# Patient Record
Sex: FEMALE | Race: WHITE | NOT HISPANIC OR LATINO | Employment: UNEMPLOYED | URBAN - METROPOLITAN AREA
[De-identification: names, ages, dates, MRNs, and addresses within clinical notes are randomized per-mention and may not be internally consistent; named-entity substitution may affect disease eponyms.]

---

## 2019-06-24 ENCOUNTER — LAB REQUISITION (OUTPATIENT)
Dept: LAB | Facility: HOSPITAL | Age: 69
End: 2019-06-24

## 2019-06-24 DIAGNOSIS — N39.0 URINARY TRACT INFECTION: ICD-10-CM

## 2019-06-24 DIAGNOSIS — N32.81 OVERACTIVE BLADDER: ICD-10-CM

## 2019-06-24 LAB
ALBUMIN SERPL-MCNC: 3.4 G/DL (ref 3.5–5.2)
ALBUMIN/GLOB SERPL: 0.9 G/DL
ALP SERPL-CCNC: 102 U/L (ref 39–117)
ALT SERPL W P-5'-P-CCNC: 47 U/L (ref 1–33)
ANION GAP SERPL CALCULATED.3IONS-SCNC: 13.6 MMOL/L
AST SERPL-CCNC: 36 U/L (ref 1–32)
BASOPHILS # BLD AUTO: 0.06 10*3/MM3 (ref 0–0.2)
BASOPHILS NFR BLD AUTO: 0.6 % (ref 0–1.5)
BILIRUB SERPL-MCNC: 0.3 MG/DL (ref 0.2–1.2)
BUN BLD-MCNC: 23 MG/DL (ref 8–23)
BUN/CREAT SERPL: 29.5 (ref 7–25)
CALCIUM SPEC-SCNC: 9.9 MG/DL (ref 8.6–10.5)
CHLORIDE SERPL-SCNC: 95 MMOL/L (ref 98–107)
CO2 SERPL-SCNC: 25.4 MMOL/L (ref 22–29)
CREAT BLD-MCNC: 0.78 MG/DL (ref 0.57–1)
DEPRECATED RDW RBC AUTO: 47.6 FL (ref 37–54)
EOSINOPHIL # BLD AUTO: 0.15 10*3/MM3 (ref 0–0.4)
EOSINOPHIL NFR BLD AUTO: 1.6 % (ref 0.3–6.2)
ERYTHROCYTE [DISTWIDTH] IN BLOOD BY AUTOMATED COUNT: 12.7 % (ref 12.3–15.4)
GFR SERPL CREATININE-BSD FRML MDRD: 73 ML/MIN/1.73
GFR SERPL CREATININE-BSD FRML MDRD: 88 ML/MIN/1.73
GLOBULIN UR ELPH-MCNC: 3.9 GM/DL
GLUCOSE BLD-MCNC: 122 MG/DL (ref 65–99)
HCT VFR BLD AUTO: 39.8 % (ref 34–46.6)
HGB BLD-MCNC: 13.2 G/DL (ref 12–15.9)
IMM GRANULOCYTES # BLD AUTO: 0.06 10*3/MM3 (ref 0–0.05)
IMM GRANULOCYTES NFR BLD AUTO: 0.6 % (ref 0–0.5)
LYMPHOCYTES # BLD AUTO: 1.73 10*3/MM3 (ref 0.7–3.1)
LYMPHOCYTES NFR BLD AUTO: 18.2 % (ref 19.6–45.3)
MCH RBC QN AUTO: 34.3 PG (ref 26.6–33)
MCHC RBC AUTO-ENTMCNC: 33.2 G/DL (ref 31.5–35.7)
MCV RBC AUTO: 103.4 FL (ref 79–97)
MONOCYTES # BLD AUTO: 1.02 10*3/MM3 (ref 0.1–0.9)
MONOCYTES NFR BLD AUTO: 10.7 % (ref 5–12)
NEUTROPHILS # BLD AUTO: 6.47 10*3/MM3 (ref 1.7–7)
NEUTROPHILS NFR BLD AUTO: 68.3 % (ref 42.7–76)
NRBC BLD AUTO-RTO: 0 /100 WBC (ref 0–0.2)
PLATELET # BLD AUTO: 387 10*3/MM3 (ref 140–450)
PMV BLD AUTO: 9.5 FL (ref 6–12)
POTASSIUM BLD-SCNC: 3.6 MMOL/L (ref 3.5–5.2)
PROT SERPL-MCNC: 7.3 G/DL (ref 6–8.5)
RBC # BLD AUTO: 3.85 10*6/MM3 (ref 3.77–5.28)
SODIUM BLD-SCNC: 134 MMOL/L (ref 136–145)
WBC NRBC COR # BLD: 9.49 10*3/MM3 (ref 3.4–10.8)

## 2019-06-24 PROCEDURE — 85025 COMPLETE CBC W/AUTO DIFF WBC: CPT | Performed by: INTERNAL MEDICINE

## 2019-06-24 PROCEDURE — 80053 COMPREHEN METABOLIC PANEL: CPT | Performed by: INTERNAL MEDICINE

## 2022-06-01 ENCOUNTER — HOSPITAL ENCOUNTER (OUTPATIENT)
Facility: HOSPITAL | Age: 72
Setting detail: OBSERVATION
Discharge: NURSING, REHAB OR LONG-TERM CARE | End: 2022-06-03
Attending: EMERGENCY MEDICINE | Admitting: INTERNAL MEDICINE

## 2022-06-01 ENCOUNTER — APPOINTMENT (OUTPATIENT)
Dept: CT IMAGING | Facility: HOSPITAL | Age: 72
End: 2022-06-01

## 2022-06-01 ENCOUNTER — APPOINTMENT (OUTPATIENT)
Dept: GENERAL RADIOLOGY | Facility: HOSPITAL | Age: 72
End: 2022-06-01

## 2022-06-01 DIAGNOSIS — S32.402A CLOSED NONDISPLACED FRACTURE OF LEFT ACETABULUM, UNSPECIFIED PORTION OF ACETABULUM, INITIAL ENCOUNTER: ICD-10-CM

## 2022-06-01 DIAGNOSIS — S32.592A CLOSED FRACTURE OF RAMUS OF LEFT PUBIS, INITIAL ENCOUNTER: Primary | ICD-10-CM

## 2022-06-01 LAB
ALBUMIN SERPL-MCNC: 4.6 G/DL (ref 3.5–5.2)
ALBUMIN/GLOB SERPL: 1.9 G/DL
ALP SERPL-CCNC: 94 U/L (ref 39–117)
ALT SERPL W P-5'-P-CCNC: 24 U/L (ref 1–33)
ANION GAP SERPL CALCULATED.3IONS-SCNC: 13.9 MMOL/L (ref 5–15)
AST SERPL-CCNC: 31 U/L (ref 1–32)
BASOPHILS # BLD AUTO: 0.05 10*3/MM3 (ref 0–0.2)
BASOPHILS NFR BLD AUTO: 0.6 % (ref 0–1.5)
BILIRUB SERPL-MCNC: 0.7 MG/DL (ref 0–1.2)
BUN SERPL-MCNC: 16 MG/DL (ref 8–23)
BUN/CREAT SERPL: 21.3 (ref 7–25)
CALCIUM SPEC-SCNC: 9.6 MG/DL (ref 8.6–10.5)
CHLORIDE SERPL-SCNC: 96 MMOL/L (ref 98–107)
CO2 SERPL-SCNC: 25.1 MMOL/L (ref 22–29)
CREAT SERPL-MCNC: 0.75 MG/DL (ref 0.57–1)
DEPRECATED RDW RBC AUTO: 48.5 FL (ref 37–54)
EGFRCR SERPLBLD CKD-EPI 2021: 84.2 ML/MIN/1.73
EOSINOPHIL # BLD AUTO: 0.08 10*3/MM3 (ref 0–0.4)
EOSINOPHIL NFR BLD AUTO: 0.9 % (ref 0.3–6.2)
ERYTHROCYTE [DISTWIDTH] IN BLOOD BY AUTOMATED COUNT: 13.2 % (ref 12.3–15.4)
ETHANOL BLD-MCNC: <10 MG/DL (ref 0–10)
ETHANOL UR QL: <0.01 %
FLUAV RNA RESP QL NAA+PROBE: NOT DETECTED
FLUBV RNA RESP QL NAA+PROBE: NOT DETECTED
GLOBULIN UR ELPH-MCNC: 2.4 GM/DL
GLUCOSE SERPL-MCNC: 66 MG/DL (ref 65–99)
HCT VFR BLD AUTO: 42.1 % (ref 34–46.6)
HGB BLD-MCNC: 13.5 G/DL (ref 12–15.9)
IMM GRANULOCYTES # BLD AUTO: 0.03 10*3/MM3 (ref 0–0.05)
IMM GRANULOCYTES NFR BLD AUTO: 0.3 % (ref 0–0.5)
INR PPP: 1.01 (ref 0.9–1.1)
LYMPHOCYTES # BLD AUTO: 1.51 10*3/MM3 (ref 0.7–3.1)
LYMPHOCYTES NFR BLD AUTO: 16.9 % (ref 19.6–45.3)
MCH RBC QN AUTO: 32.3 PG (ref 26.6–33)
MCHC RBC AUTO-ENTMCNC: 32.1 G/DL (ref 31.5–35.7)
MCV RBC AUTO: 100.7 FL (ref 79–97)
MONOCYTES # BLD AUTO: 0.76 10*3/MM3 (ref 0.1–0.9)
MONOCYTES NFR BLD AUTO: 8.5 % (ref 5–12)
NEUTROPHILS NFR BLD AUTO: 6.48 10*3/MM3 (ref 1.7–7)
NEUTROPHILS NFR BLD AUTO: 72.8 % (ref 42.7–76)
NRBC BLD AUTO-RTO: 0 /100 WBC (ref 0–0.2)
PLATELET # BLD AUTO: 171 10*3/MM3 (ref 140–450)
PMV BLD AUTO: 10.3 FL (ref 6–12)
POTASSIUM SERPL-SCNC: 4.3 MMOL/L (ref 3.5–5.2)
PROT SERPL-MCNC: 7 G/DL (ref 6–8.5)
PROTHROMBIN TIME: 13.5 SECONDS (ref 12.1–15)
RBC # BLD AUTO: 4.18 10*6/MM3 (ref 3.77–5.28)
SARS-COV-2 RNA RESP QL NAA+PROBE: NOT DETECTED
SODIUM SERPL-SCNC: 135 MMOL/L (ref 136–145)
TROPONIN T SERPL-MCNC: <0.01 NG/ML (ref 0–0.03)
TSH SERPL DL<=0.05 MIU/L-ACNC: 1.6 UIU/ML (ref 0.27–4.2)
WBC NRBC COR # BLD: 8.91 10*3/MM3 (ref 3.4–10.8)

## 2022-06-01 PROCEDURE — G0378 HOSPITAL OBSERVATION PER HR: HCPCS

## 2022-06-01 PROCEDURE — 93010 ELECTROCARDIOGRAM REPORT: CPT | Performed by: INTERNAL MEDICINE

## 2022-06-01 PROCEDURE — 84443 ASSAY THYROID STIM HORMONE: CPT | Performed by: NURSE PRACTITIONER

## 2022-06-01 PROCEDURE — 82077 ASSAY SPEC XCP UR&BREATH IA: CPT | Performed by: NURSE PRACTITIONER

## 2022-06-01 PROCEDURE — 93005 ELECTROCARDIOGRAM TRACING: CPT | Performed by: NURSE PRACTITIONER

## 2022-06-01 PROCEDURE — 87636 SARSCOV2 & INF A&B AMP PRB: CPT | Performed by: EMERGENCY MEDICINE

## 2022-06-01 PROCEDURE — 99220 PR INITIAL OBSERVATION CARE/DAY 70 MINUTES: CPT | Performed by: NURSE PRACTITIONER

## 2022-06-01 PROCEDURE — 85610 PROTHROMBIN TIME: CPT | Performed by: NURSE PRACTITIONER

## 2022-06-01 PROCEDURE — 84484 ASSAY OF TROPONIN QUANT: CPT | Performed by: NURSE PRACTITIONER

## 2022-06-01 PROCEDURE — 72192 CT PELVIS W/O DYE: CPT

## 2022-06-01 PROCEDURE — C9803 HOPD COVID-19 SPEC COLLECT: HCPCS

## 2022-06-01 PROCEDURE — 99283 EMERGENCY DEPT VISIT LOW MDM: CPT | Performed by: EMERGENCY MEDICINE

## 2022-06-01 PROCEDURE — 99283 EMERGENCY DEPT VISIT LOW MDM: CPT

## 2022-06-01 PROCEDURE — 85025 COMPLETE CBC W/AUTO DIFF WBC: CPT | Performed by: EMERGENCY MEDICINE

## 2022-06-01 PROCEDURE — 73502 X-RAY EXAM HIP UNI 2-3 VIEWS: CPT

## 2022-06-01 PROCEDURE — 80053 COMPREHEN METABOLIC PANEL: CPT | Performed by: EMERGENCY MEDICINE

## 2022-06-01 RX ORDER — ACETAMINOPHEN 650 MG/1
650 SUPPOSITORY RECTAL EVERY 4 HOURS PRN
Status: DISCONTINUED | OUTPATIENT
Start: 2022-06-01 | End: 2022-06-03 | Stop reason: HOSPADM

## 2022-06-01 RX ORDER — ENOXAPARIN SODIUM 100 MG/ML
40 INJECTION SUBCUTANEOUS EVERY 24 HOURS
Status: DISCONTINUED | OUTPATIENT
Start: 2022-06-02 | End: 2022-06-03 | Stop reason: HOSPADM

## 2022-06-01 RX ORDER — SODIUM CHLORIDE 0.9 % (FLUSH) 0.9 %
10 SYRINGE (ML) INJECTION EVERY 12 HOURS SCHEDULED
Status: DISCONTINUED | OUTPATIENT
Start: 2022-06-01 | End: 2022-06-03 | Stop reason: HOSPADM

## 2022-06-01 RX ORDER — SODIUM CHLORIDE 9 MG/ML
40 INJECTION, SOLUTION INTRAVENOUS AS NEEDED
Status: DISCONTINUED | OUTPATIENT
Start: 2022-06-01 | End: 2022-06-03 | Stop reason: HOSPADM

## 2022-06-01 RX ORDER — OXYCODONE HYDROCHLORIDE AND ACETAMINOPHEN 5; 325 MG/1; MG/1
1 TABLET ORAL EVERY 4 HOURS PRN
Status: DISCONTINUED | OUTPATIENT
Start: 2022-06-01 | End: 2022-06-02

## 2022-06-01 RX ORDER — ONDANSETRON 2 MG/ML
4 INJECTION INTRAMUSCULAR; INTRAVENOUS EVERY 6 HOURS PRN
Status: DISCONTINUED | OUTPATIENT
Start: 2022-06-01 | End: 2022-06-03 | Stop reason: HOSPADM

## 2022-06-01 RX ORDER — SODIUM CHLORIDE 0.9 % (FLUSH) 0.9 %
10 SYRINGE (ML) INJECTION AS NEEDED
Status: DISCONTINUED | OUTPATIENT
Start: 2022-06-01 | End: 2022-06-03 | Stop reason: HOSPADM

## 2022-06-01 RX ORDER — ACETAMINOPHEN 160 MG/5ML
650 SOLUTION ORAL EVERY 4 HOURS PRN
Status: DISCONTINUED | OUTPATIENT
Start: 2022-06-01 | End: 2022-06-03 | Stop reason: HOSPADM

## 2022-06-01 RX ORDER — CHOLECALCIFEROL (VITAMIN D3) 125 MCG
5 CAPSULE ORAL NIGHTLY PRN
Status: DISCONTINUED | OUTPATIENT
Start: 2022-06-01 | End: 2022-06-03 | Stop reason: HOSPADM

## 2022-06-01 RX ORDER — ONDANSETRON 4 MG/1
4 TABLET, FILM COATED ORAL EVERY 6 HOURS PRN
Status: DISCONTINUED | OUTPATIENT
Start: 2022-06-01 | End: 2022-06-03 | Stop reason: HOSPADM

## 2022-06-01 RX ORDER — ACETAMINOPHEN 325 MG/1
650 TABLET ORAL EVERY 4 HOURS PRN
Status: DISCONTINUED | OUTPATIENT
Start: 2022-06-01 | End: 2022-06-03 | Stop reason: HOSPADM

## 2022-06-01 RX ADMIN — OXYCODONE HYDROCHLORIDE AND ACETAMINOPHEN 1 TABLET: 5; 325 TABLET ORAL at 22:27

## 2022-06-01 RX ADMIN — Medication 10 ML: at 22:27

## 2022-06-01 NOTE — ED PROVIDER NOTES
"Subjective     History provided by:  Patient and relative (Son)    History of Present Illness    · Chief complaint: Pain in the left groin of the pelvis.    · Location: Left groin groin to the pelvis.    · Quality/Severity: Pain is moderately severe.    · Timing/Onset: She fell injuring herself yesterday.    · Modifying Factors: She is unable to bear weight on her left lower extremity due to the pain.    · Associated symptoms: Denies striking her head denies neck or back pain.    · Narrative: The patient is a 72-year-old white female who states she lost her balance and fell on her left side yesterday.  She has had pain in her left groin area since.  She has been unable to bear weight on her left lower extremity due to pain in the left groin.  She normally ambulates with a walker.  She has a history of cervical and lumbar spine surgeries.  She has a history of chronic pain disorder.    Review of Systems   Constitutional: Negative for activity change, appetite change, chills and fever.   HENT: Negative for congestion, ear pain, rhinorrhea, sinus pain, sore throat and voice change.    Eyes: Negative for pain and visual disturbance.   Respiratory: Negative for shortness of breath.    Cardiovascular: Negative for chest pain and palpitations.   Gastrointestinal: Negative for abdominal pain, diarrhea, nausea and vomiting.   Genitourinary: Positive for pelvic pain ( Left groin). Negative for dysuria and flank pain.   Musculoskeletal: Positive for gait problem ( Due to left groin pain). Negative for neck pain and neck stiffness.   Skin: Negative for rash.   Neurological: Negative for dizziness, syncope, numbness and headaches.     History reviewed. No pertinent past medical history.  /79 (BP Location: Left arm, Patient Position: Lying)   Pulse 90   Temp 98.8 °F (37.1 °C) (Oral)   Resp 16   Ht 157.5 cm (62\")   Wt 54 kg (119 lb)   SpO2 96%   BMI 21.77 kg/m²     History reviewed. No pertinent past medical " history.  Labs Reviewed   COMPREHENSIVE METABOLIC PANEL - Abnormal; Notable for the following components:       Result Value    Sodium 135 (*)     Chloride 96 (*)     All other components within normal limits    Narrative:     GFR Normal >60  Chronic Kidney Disease <60  Kidney Failure <15     CBC WITH AUTO DIFFERENTIAL - Abnormal; Notable for the following components:    .7 (*)     Lymphocyte % 16.9 (*)     All other components within normal limits   COVID PRE-OP / PRE-PROCEDURE SCREENING ORDER (NO ISOLATION)    Narrative:     The following orders were created for panel order COVID PRE-OP / PRE-PROCEDURE SCREENING ORDER (NO ISOLATION) - Swab, Nasopharynx.  Procedure                               Abnormality         Status                     ---------                               -----------         ------                     COVID-19 and FLU A/B PCR...[[id number]]                      In process                   Please view results for these tests on the individual orders.   COVID-19 AND FLU A/B, NP SWAB IN TRANSPORT MEDIA 8-12 HR TAT   URINE DRUG SCREEN   ETHANOL   CBC AND DIFFERENTIAL    Narrative:     The following orders were created for panel order CBC & Differential.  Procedure                               Abnormality         Status                     ---------                               -----------         ------                     CBC Auto Differential[[id number]]        Abnormal            Final result                 Please view results for these tests on the individual orders.       No Known Allergies    History reviewed. No pertinent surgical history.    History reviewed. No pertinent family history.    Social History     Socioeconomic History   • Marital status: Single   Tobacco Use   • Smoking status: Current Every Day Smoker     Packs/day: 0.50   Substance and Sexual Activity   • Alcohol use: Yes     Comment: SOCIALLY   • Drug use: Never           Objective   Physical Exam  Vitals and  nursing note reviewed.   Constitutional:       General: She is not in acute distress.     Appearance: Normal appearance. She is well-developed. She is not ill-appearing, toxic-appearing or diaphoretic.      Comments: The patient appears in no acute distress.  Review of her vital signs: She is afebrile with a temperature of 98.4, respirations normal 16 with a normal room air oxygen saturation 100%, slightly tachycardic with a heart rate of 100, blood pressure normal 131/82.   HENT:      Head: Normocephalic and atraumatic.      Nose: Nose normal.      Mouth/Throat:      Mouth: Mucous membranes are moist.      Pharynx: Oropharynx is clear.   Eyes:      General: No scleral icterus.        Right eye: No discharge.         Left eye: No discharge.      Conjunctiva/sclera: Conjunctivae normal.      Pupils: Pupils are equal, round, and reactive to light.   Neck:      Thyroid: No thyromegaly.      Vascular: No JVD.   Cardiovascular:      Rate and Rhythm: Normal rate and regular rhythm.      Heart sounds: Normal heart sounds. No murmur heard.  Pulmonary:      Effort: Pulmonary effort is normal.      Breath sounds: Normal breath sounds. No wheezing or rales.   Chest:      Chest wall: No tenderness.   Abdominal:      General: Bowel sounds are normal. There is no distension.      Palpations: Abdomen is soft.      Tenderness: There is no abdominal tenderness. There is no guarding.   Musculoskeletal:         General: No tenderness or deformity. Normal range of motion.      Cervical back: Normal range of motion and neck supple. No tenderness.      Right lower leg: Edema present.      Left lower leg: Edema present.      Comments: The patient is no tenderness to the thoracic or lumbar spine.  Her pelvis is stable.  She has no tenderness to palpation over the left or right hip.  She does have range of motion of the left hip with discomfort in the left groin area.  There is no tenderness or deformity of the left thigh, knee, lower leg,  foot or ankle.   Lymphadenopathy:      Cervical: No cervical adenopathy.   Skin:     General: Skin is warm and dry.      Capillary Refill: Capillary refill takes less than 2 seconds.      Findings: No rash.      Comments: Both feet have a purplish cyanotic this colorization.   Neurological:      General: No focal deficit present.      Mental Status: She is alert and oriented to person, place, and time.      Cranial Nerves: No cranial nerve deficit.      Coordination: Coordination normal.      Comments: No focal motor sensory deficit   Psychiatric:         Mood and Affect: Mood normal.         Behavior: Behavior normal.         Thought Content: Thought content normal.         Judgment: Judgment normal.         Procedures           ED Course  ED Course as of 06/01/22 2146 Wed Jun 01, 2022 2142 The patient's x-ray of her pelvis and left hip showed superior and inferior pubic ramus fractures.  A CT was obtained to further delineate them.  The CT of the pelvis showed a slightly displaced inferior pubic ramus fracture, minimal cortical disruption of the superior pubic ramus, and a subtle probable transverse fracture of the acetabulum. [TP]   2143 18:55 and again at 19:20 the patient was discussed with Dr. Reed, orthopedics on-call, who states that the patient's fractures appear to be stable and not require surgery.  He stated the patient could go home if she could ambulate with a walker, otherwise she would need to be admitted for physical therapy evaluation. [TP]   2144 We did get the patient up and attempt to ambulate her with a walker and she was unable to move her left leg to ambulate using a walker. [TP]   2144 20:11 patient discussed with Renata Naqvi, hospitalist, who agreed to admit the patient for physical therapy evaluation. [TP]   2145 Review of the patient's admission labs: Her CMP has a slightly low sodium of 135 and a slightly low chloride of 96, otherwise normal electrolytes, normal renal and liver  function test.  CBC was essentially normal. [TP]      ED Course User Index  [TP] Tramaine Baldwin MD                                                 [facility]  Number of Diagnoses or Management Options  Closed fracture of ramus of left pubis, initial encounter ([facility]): new and requires workup  Closed nondisplaced fracture of left acetabulum, unspecified portion of acetabulum, initial encounter ([facility]): new and requires workup     Amount and/or Complexity of Data Reviewed  Clinical lab tests: ordered and reviewed  Tests in the radiology section of CPT®: ordered and reviewed  Discuss the patient with other providers: yes    Risk of Complications, Morbidity, and/or Mortality  Presenting problems: moderate  Diagnostic procedures: high  Management options: moderate    Patient Progress  Patient progress: stable      Final diagnoses:   Closed fracture of ramus of left pubis, initial encounter ([facility])   Closed nondisplaced fracture of left acetabulum, unspecified portion of acetabulum, initial encounter ([facility])       ED Disposition  ED Disposition     ED Disposition   Decision to Admit    Condition   --    Comment   Level of Care: Med/Surg [1]   Diagnosis: Closed fracture of ramus of left pubis, initial encounter ([facility]) [[id number]]   Admitting Physician: MYRIAM FUENTES [[id number]]   Bed Request Comments: Left superior and inferior pubic ramus fractures and acetabular fracture               No follow-up provider specified.       Medication List      No changes were made to your prescriptions during this visit.         Labs Reviewed   COMPREHENSIVE METABOLIC PANEL - Abnormal; Notable for the following components:       Result Value    Sodium 135 (*)     Chloride 96 (*)     All other components within normal limits    Narrative:     GFR Normal >60  Chronic Kidney Disease <60  Kidney Failure <15     CBC WITH AUTO DIFFERENTIAL - Abnormal; Notable for the following components:    .7 (*)     Lymphocyte % 16.9 (*)     All other components within  normal limits   COVID PRE-OP / PRE-PROCEDURE SCREENING ORDER (NO ISOLATION)    Narrative:     The following orders were created for panel order COVID PRE-OP / PRE-PROCEDURE SCREENING ORDER (NO ISOLATION) - Swab, Nasopharynx.  Procedure                               Abnormality         Status                     ---------                               -----------         ------                     COVID-19 and FLU A/B PCR...[[id number]]                      In process                   Please view results for these tests on the individual orders.   COVID-19 AND FLU A/B, NP SWAB IN TRANSPORT MEDIA 8-12 HR TAT   URINE DRUG SCREEN   ETHANOL   CBC AND DIFFERENTIAL    Narrative:     The following orders were created for panel order CBC & Differential.  Procedure                               Abnormality         Status                     ---------                               -----------         ------                     CBC Auto Differential[[id number]]        Abnormal            Final result                 Please view results for these tests on the individual orders.     CT Pelvis Without Contrast   Final Result   Slightly displaced mid left inferior pubic ramus fracture      Subtle probable transverse cortical fracture involving the acetabulum. Millimeters anterior to the left hip prosthesis      Possible minimal cortical interruption in the left suprapubic ramus near the pubic symphysis. These images are degraded by streak artifact               Signer Name: Sean Valles MD    Signed: 6/1/2022 6:45 PM    Workstation Name: [facility]     [facility]      XR Hip With or Without Pelvis 2 - 3 View Left   Final Result   Findings suggest a subtle fracture involving the superior pubic ramus. There is a more conspicuous disruption of the inferior pubic ramus but it is difficult to tell if that represents an old injury or an acute injury. Consider CT imaging      Signer Name: Sean Valles MD    Signed:  6/1/2022 5:47 PM    Workstation Name: [email or web address]     [facility]             Medication List      No changes were made to your prescriptions during this visit.              Tramaine Baldwin MD  06/01/22 9190

## 2022-06-02 PROBLEM — E44.0 MODERATE MALNUTRITION (HCC): Status: ACTIVE | Noted: 2022-06-02

## 2022-06-02 LAB
AMPHET+METHAMPHET UR QL: NEGATIVE
AMPHETAMINES UR QL: NEGATIVE
BACTERIA UR QL AUTO: ABNORMAL /HPF
BARBITURATES UR QL SCN: NEGATIVE
BENZODIAZ UR QL SCN: NEGATIVE
BILIRUB UR QL STRIP: NEGATIVE
BUPRENORPHINE SERPL-MCNC: NEGATIVE NG/ML
CANNABINOIDS SERPL QL: NEGATIVE
CLARITY UR: ABNORMAL
COCAINE UR QL: NEGATIVE
COLOR UR: YELLOW
GLUCOSE UR STRIP-MCNC: NEGATIVE MG/DL
HGB UR QL STRIP.AUTO: ABNORMAL
HYALINE CASTS UR QL AUTO: ABNORMAL /LPF
KETONES UR QL STRIP: ABNORMAL
LEUKOCYTE ESTERASE UR QL STRIP.AUTO: ABNORMAL
METHADONE UR QL SCN: NEGATIVE
NITRITE UR QL STRIP: NEGATIVE
OPIATES UR QL: POSITIVE
OXYCODONE UR QL SCN: POSITIVE
PCP UR QL SCN: NEGATIVE
PH UR STRIP.AUTO: 6 [PH] (ref 4.5–8)
PROPOXYPH UR QL: NEGATIVE
PROT UR QL STRIP: NEGATIVE
QT INTERVAL: 360 MS
RBC # UR STRIP: ABNORMAL /HPF
REF LAB TEST METHOD: ABNORMAL
SP GR UR STRIP: 1.02 (ref 1–1.03)
SQUAMOUS #/AREA URNS HPF: ABNORMAL /HPF
TRICYCLICS UR QL SCN: NEGATIVE
UROBILINOGEN UR QL STRIP: ABNORMAL
WBC # UR STRIP: ABNORMAL /HPF

## 2022-06-02 PROCEDURE — 99225 PR SBSQ OBSERVATION CARE/DAY 25 MINUTES: CPT | Performed by: INTERNAL MEDICINE

## 2022-06-02 PROCEDURE — 81001 URINALYSIS AUTO W/SCOPE: CPT | Performed by: NURSE PRACTITIONER

## 2022-06-02 PROCEDURE — 87086 URINE CULTURE/COLONY COUNT: CPT | Performed by: NURSE PRACTITIONER

## 2022-06-02 PROCEDURE — 96372 THER/PROPH/DIAG INJ SC/IM: CPT

## 2022-06-02 PROCEDURE — G0378 HOSPITAL OBSERVATION PER HR: HCPCS

## 2022-06-02 PROCEDURE — 96374 THER/PROPH/DIAG INJ IV PUSH: CPT

## 2022-06-02 PROCEDURE — 97161 PT EVAL LOW COMPLEX 20 MIN: CPT

## 2022-06-02 PROCEDURE — 97165 OT EVAL LOW COMPLEX 30 MIN: CPT

## 2022-06-02 PROCEDURE — 80306 DRUG TEST PRSMV INSTRMNT: CPT | Performed by: NURSE PRACTITIONER

## 2022-06-02 PROCEDURE — 25010000002 HYDROMORPHONE PER 4 MG: Performed by: NURSE PRACTITIONER

## 2022-06-02 PROCEDURE — 25010000002 ENOXAPARIN PER 10 MG: Performed by: NURSE PRACTITIONER

## 2022-06-02 RX ORDER — OXYCODONE AND ACETAMINOPHEN 7.5; 325 MG/1; MG/1
1 TABLET ORAL EVERY 4 HOURS PRN
Status: DISCONTINUED | OUTPATIENT
Start: 2022-06-02 | End: 2022-06-02

## 2022-06-02 RX ORDER — NICOTINE 21 MG/24HR
1 PATCH, TRANSDERMAL 24 HOURS TRANSDERMAL
Status: DISCONTINUED | OUTPATIENT
Start: 2022-06-02 | End: 2022-06-03 | Stop reason: HOSPADM

## 2022-06-02 RX ORDER — GABAPENTIN 100 MG/1
100 CAPSULE ORAL EVERY 12 HOURS SCHEDULED
Status: DISCONTINUED | OUTPATIENT
Start: 2022-06-02 | End: 2022-06-02

## 2022-06-02 RX ORDER — HYDROCODONE BITARTRATE AND ACETAMINOPHEN 7.5; 325 MG/1; MG/1
1 TABLET ORAL EVERY 4 HOURS PRN
Status: DISCONTINUED | OUTPATIENT
Start: 2022-06-02 | End: 2022-06-02

## 2022-06-02 RX ORDER — HYDROMORPHONE HCL 110MG/55ML
0.5 PATIENT CONTROLLED ANALGESIA SYRINGE INTRAVENOUS
Status: DISCONTINUED | OUTPATIENT
Start: 2022-06-02 | End: 2022-06-02

## 2022-06-02 RX ORDER — GABAPENTIN 100 MG/1
100 CAPSULE ORAL EVERY 8 HOURS SCHEDULED
Status: DISCONTINUED | OUTPATIENT
Start: 2022-06-02 | End: 2022-06-03 | Stop reason: HOSPADM

## 2022-06-02 RX ORDER — HYDROCODONE BITARTRATE AND ACETAMINOPHEN 7.5; 325 MG/1; MG/1
1 TABLET ORAL EVERY 4 HOURS PRN
Status: DISCONTINUED | OUTPATIENT
Start: 2022-06-02 | End: 2022-06-03 | Stop reason: HOSPADM

## 2022-06-02 RX ADMIN — HYDROMORPHONE HYDROCHLORIDE 0.5 MG: 2 INJECTION, SOLUTION INTRAMUSCULAR; INTRAVENOUS; SUBCUTANEOUS at 03:08

## 2022-06-02 RX ADMIN — OXYCODONE HYDROCHLORIDE AND ACETAMINOPHEN 1 TABLET: 5; 325 TABLET ORAL at 02:37

## 2022-06-02 RX ADMIN — DICLOFENAC 4 G: 10 GEL TOPICAL at 22:43

## 2022-06-02 RX ADMIN — DICLOFENAC 4 G: 10 GEL TOPICAL at 13:21

## 2022-06-02 RX ADMIN — DICLOFENAC 4 G: 10 GEL TOPICAL at 08:16

## 2022-06-02 RX ADMIN — GABAPENTIN 100 MG: 100 CAPSULE ORAL at 22:43

## 2022-06-02 RX ADMIN — ENOXAPARIN SODIUM 40 MG: 40 INJECTION SUBCUTANEOUS at 08:15

## 2022-06-02 RX ADMIN — GABAPENTIN 100 MG: 100 CAPSULE ORAL at 08:39

## 2022-06-02 RX ADMIN — Medication 1 PATCH: at 08:15

## 2022-06-02 RX ADMIN — DICLOFENAC 4 G: 10 GEL TOPICAL at 17:32

## 2022-06-02 RX ADMIN — HYDROCODONE BITARTRATE AND ACETAMINOPHEN 1 TABLET: 7.5; 325 TABLET ORAL at 20:10

## 2022-06-02 RX ADMIN — HYDROCODONE BITARTRATE AND ACETAMINOPHEN 1 TABLET: 7.5; 325 TABLET ORAL at 08:39

## 2022-06-02 RX ADMIN — Medication 10 ML: at 08:15

## 2022-06-02 RX ADMIN — HYDROCODONE BITARTRATE AND ACETAMINOPHEN 1 TABLET: 7.5; 325 TABLET ORAL at 14:42

## 2022-06-02 NOTE — PLAN OF CARE
Goal Outcome Evaluation:  Plan of Care Reviewed With: patient           Outcome Evaluation: OT evaluation completed. pt required mod assist x 2 for functional transfers from EOB with RW x 2 attempts. pt unable to achieve full upright posture 2 pain. anticipate pt will require mod assist for lb adls. pt with decreased safety awareness, unaware of need for assist. pt would benefit from skilled OT services to address adl retraining, functional transfers and balance. anticipate pt will require STR prior to return home alone

## 2022-06-02 NOTE — CASE MANAGEMENT/SOCIAL WORK
Continued Stay Note  KODY Padron     Patient Name: Maru Doty  MRN: [id number]  Today's Date: 6/2/2022    Admit Date: 6/1/2022     Discharge Plan     Row Name 06/02/22 1425       Plan    Plan STR    Patient/Family in Agreement with Plan yes    Plan Comments Followed back up with patient and she states that her family will not be able to stay with her and she would like referral sent to [facility] and [facility] [place]. CM called and left voice mail for Ana at [facility] regarding the referral and also put in EPIC basked. CM called and spoke with Mellissa at [facility] [place] with referral and also put in EPIC basket. CM awaits return call from facilities for disposition.               Discharge Codes    No documentation.                     Nguyen Padilla RN

## 2022-06-02 NOTE — PROGRESS NOTES
Contacted by staff regarding patient having 10 out of 10 pain.  Added IV Dilaudid dosing, increase Norco to 7.5 mg, add Voltaren for knee pain, monitor all for effect

## 2022-06-02 NOTE — CASE MANAGEMENT/SOCIAL WORK
Discharge Planning Assessment  KODY Padron     Patient Name: Maru Doty  MRN: [id number]  Today's Date: 6/2/2022    Admit Date: 6/1/2022     Discharge Needs Assessment     Row Name 06/02/22 1118       Living Environment    People in Home alone    Current Living Arrangements apartment  first floor apartment with no steps to gain entry    Duration at Residence 3 Y    Potentially Unsafe Housing Conditions --  none    Primary Care Provided by self    Provides Primary Care For no one    Caregiving Concerns caring for self due to pelvic fx    Family Caregiver if Needed child(sathya), adult    Family Caregiver Names daughter Valentine and son Ronald and two nieces    Quality of Family Relationships unable to assess    Able to Return to Prior Arrangements --  pt is unsure at this time if she will be able to return back to her home and will need to speak with her children to see if they can stay with her    Living Arrangement Comments Patient states she lives alone in a first floor apartment with no steps to gain entry       Resource/Environmental Concerns    Resource/Environmental Concerns none    Transportation Concerns none       Transition Planning    Patient/Family Anticipates Transition to home with family;inpatient rehabilitation facility    Patient/Family Anticipated Services at Transition home health care;rehabilitation services    Transportation Anticipated family or friend will provide  patient states that either her son on daughter will be able to provide ride home at discharge       Discharge Needs Assessment    Readmission Within the Last 30 Days no previous admission in last 30 days    Current Outpatient/Agency/Support Group --  none    Equipment Currently Used at Home grab bar;cane, straight;walker, rolling;shower chair    Concerns to be Addressed adjustment to diagnosis/illness;discharge planning    Concerns Comments decreased mobility due to recent fall and pelvic fx    Anticipated Changes Related to Illness  inability to care for self    Equipment Needed After Discharge none    Outpatient/Agency/Support Group Needs homecare agency    Discharge Facility/Level of Care Needs rehabilitation facility;home with home health    Provided Post Acute Provider List? Yes    Post Acute Provider List Home Health;Inpatient Rehab;Nursing Home    Provided Post Acute Provider Quality & Resource List? Yes    Post Acute Provider Quality and Resource List Home Health;Inpatient Rehab;Nursing Home    Delivered To Patient    Method of Delivery In person    Patient's Choice of Community Agency(s) none    Current Discharge Risk dependent with mobility/activities of daily living    Discharge Coordination/Progress Patient states she will need to speak with her adult children to see if they can help at home and go home with home health vs STR               Discharge Plan     Row Name 06/02/22 1127       Plan    Plan Home with home health vs STR    Patient/Family in Agreement with Plan yes    Plan Comments Into room and introduced self and role of CM. Discussed discharge disposition with patient at bedside. Patient is currently sitting up at side of the bed and has no complaints at this time. Patient confirms that none of the information on her face sheet is correct and CM updated demographics reflecting current info. Patient states that she see's Dr. Wade Swann as her PCP. She states that she uses [facility] in [place] and normally has no problem paying for her meds and her daughter picks them up. She also states that she does not have a living will and declines information regarding one. Patient states she lives alone in a first floor apartment with no steps to gain entry and normally has no problem maneuvering in or out or within the home. She states that she is independent with her ADL's but does not drive and her daughter provides her transportation and will be able to provide ride home at discharge. She also states that she currently  "uses a rolling walker, shower chair, grab bars and has straight canes she can use and does not anticipate needing any other equipment at discharge. Patient states she has used home health in the past and thinks it was [facility] and would use them again if needed. CM did discuss with patient the possible need for STR due to immobility and living alone and patient states \"I would rather go home and have home health come for PT\". CM informed patient that PT/OT would evaluate today and give their recommendations and if they recommended STR would she consider it and she states \"I will need to talk with my children before deciding\". CM also discussed with patient that if she did go  home with home health for her safety she would need someone to stay with her for a while to help with her needs and she states \"I will check with my daughter to see if she can\". CM provided patient with a list of home health agencies and STR facilities for her preference. Patient had no other questions or concerns regarding discharge plans. Name and number placed on white board in room. CM will continue to follow for needs.              Continued Care and Services - Admitted Since 6/1/2022    Coordination has not been started for this encounter.          Demographic Summary     Row Name 06/02/22 1118       General Information    Admission Type observation    Arrived From home    Referral Source admission list    Reason for Consult discharge planning    Preferred Language English       Contact Information    Permission Granted to Share Info With                Functional Status    No documentation.                Psychosocial    No documentation.                Abuse/Neglect    No documentation.                Legal    No documentation.                Substance Abuse    No documentation.                Patient Forms    No documentation.                   Nguyen Padilla RN    "

## 2022-06-02 NOTE — H&P
[facility] HOSPITALIST     Nhan Enriquez MD    CHIEF COMPLAINT: hip pain, fall    HISTORY OF PRESENT ILLNESS:  The patient is a 72 YOF who presented to the emergency department secondary to acute onset left groin pain after accidental fall on 5/31/2022.  Since then she has been unable to bear weight on the left leg due to pain.  She typically ambulates with a walker and was unable to ambulate in ER.  ER provider contacted Dr. Reed who noted no surgical intervention planned however if patient unable to bear weight and ambulate with walker, needed admission to assist. Patient notes she lives alone, does not drive.    Diagnostics in ER revealed slightly displaced mid left inferior pubic ramus fracture, transverse cortical fracture involving the acetabulum, see full report in epic    History of gait instability with frequent falls, cervical myelopathy, hypertension, hyperlipidemia, depression, chronic pain on pain contract, tobacco abuse, urinary incontinence, left femoral neck fracture 2014    She otherwise denies f/c/headache/rhinorrhea/nasal congestion/lightheadedness/syncopal sensation/cough/soa/n/v/d/chest pain/abdominal pain/recent illness/sick exposures/change in bowel or bladder habits/no weight change/bloody emesis or bloody stools/change in medications or any other new concerns.    History reviewed. No pertinent past medical history.  History reviewed. No pertinent surgical history.  History reviewed. No pertinent family history.  Social History     Tobacco Use   • Smoking status: Current Every Day Smoker     Packs/day: 0.50   Substance Use Topics   • Alcohol use: Yes     Comment: SOCIALLY   • Drug use: Never     No medications prior to admission.     Allergies:  Patient has no known allergies.    Immunization History   Administered Date(s) Administered   • COVID-19 (PFIZER) PURPLE CAP 03/16/2021, 04/06/2021, 11/30/2021       REVIEW OF SYSTEMS:  Please see the above history of present  "illness for pertinent positives and negatives.  The remainder of the patient's systems have been reviewed and are negative.     Vital Signs  Temp:  [98.4 °F (36.9 °C)-98.8 °F (37.1 °C)] 98.8 °F (37.1 °C)  Heart Rate:  [] 90  Resp:  [16] 16  BP: (107-131)/(66-83) 109/79   Body mass index is 21.77 kg/m².    Flowsheet Rows    Flowsheet Row First Filed Value   Admission Height 157.5 cm (62\") Documented at 06/01/2022 1653   Admission Weight 54 kg (119 lb) Documented at 06/01/2022 1653             Physical Exam  Vitals reviewed.   Constitutional:       General: She is not in acute distress.     Appearance: Normal appearance. She is not ill-appearing.   HENT:      Head: Normocephalic and atraumatic.      Mouth/Throat:      Mouth: Mucous membranes are moist.   Eyes:      Extraocular Movements: Extraocular movements intact.      Pupils: Pupils are equal, round, and reactive to light.   Cardiovascular:      Rate and Rhythm: Normal rate and regular rhythm.   Pulmonary:      Effort: Pulmonary effort is normal. No respiratory distress.      Breath sounds: Normal breath sounds. No wheezing or rales.   Abdominal:      General: Abdomen is flat. Bowel sounds are normal. There is no distension.      Palpations: Abdomen is soft.      Tenderness: There is no abdominal tenderness. There is no guarding.   Musculoskeletal:      Comments: Trace L>R foot pitting edema   Skin:     General: Skin is warm and dry.      Capillary Refill: Capillary refill takes less than 2 seconds.      Comments: Bilateral feet with fungal toenail overgrowth, purplish discoloration. Pedal pulses palpable   Neurological:      General: No focal deficit present.      Mental Status: She is alert and oriented to person, place, and time.   Psychiatric:         Mood and Affect: Mood normal.         Behavior: Behavior normal.       Emotional Behavior:    Judgement and Insight: good   Mental Status:  Alertness  alert   Memory:  good   Mood and Affect:     "     Depression  none               Anxiety  none    Debilities:   Physical Weakness  Chronic and secondary to fracture   Handicaps  Chronic LE weakness   Disabilities  Chronic LE weakness   Agitation  none     Results Review:    I reviewed the patient's new clinical results.  Lab Results (most recent)     None          Imaging Results (Most Recent)     Procedure Component Value Units Date/Time    CT Pelvis Without Contrast [[id number]] Collected: 06/01/22 1845     Updated: 06/01/22 1847    Narrative:      CT Pelvis WO    INDICATION:   Fall yesterday with left hip pain and abnormal plain films suggesting pubic rami fractures on the left    TECHNIQUE:   CT of the pelvis without IV contrast. Coronal and sagittal reconstructions were obtained.  Radiation dose reduction techniques included automated exposure control or exposure modulation based on body size. Count of known CT and cardiac nuc med studies  performed in previous 12 months: 0.     COMPARISON:   Hip series earlier today    FINDINGS:  There is grade 1 anterior spondylolisthesis of L4 and L5. There are marked facet degenerative changes at L4-5. The sacrum is intact. There is a definite fracture through the inferior pubic ramus that is displaced about 5 mm. Is difficult to tell if there  is a subtle cortical fracture in the left superior pubic ramus near the pubic symphysis because of streak artifact from the hip prosthesis. There appears be a small cortical fracture involving the acetabulum just anterior to the hip prosthesis on axial  image 30 is nondisplaced.. There is no dislocation      Impression:      Slightly displaced mid left inferior pubic ramus fracture    Subtle probable transverse cortical fracture involving the acetabulum. Millimeters anterior to the left hip prosthesis    Possible minimal cortical interruption in the left suprapubic ramus near the pubic symphysis. These images are degraded by streak artifact          Signer Name: Sean Valles MD    Signed: 6/1/2022 6:45 PM   Workstation Name: [facility]    [facility]    XR Hip With or Without Pelvis 2 - 3 View Left [[id number]] Collected: 06/01/22 1747     Updated: 06/01/22 1749    Narrative:      CR Hip Uni Comp Min 2 Vws LT    INDICATION:   Fall yesterday with pain in left hip region. Patient had hip replacement 6 years ago.    COMPARISON:   None available.    FINDINGS:  AP and frog-leg lateral view(s) of the left hip.  The AP view of the right hip appears normal. On the left side there is irregularity within the inferior pubic ramus is a subtle step-off involving the superior pubic ramus where it meets the pubic  symphysis. The left hip prosthesis is intact and there is no dislocation No bone erosion or destruction.        Impression:      Findings suggest a subtle fracture involving the superior pubic ramus. There is a more conspicuous disruption of the inferior pubic ramus but it is difficult to tell if that represents an old injury or an acute injury. Consider CT imaging    Signer Name: Sean Valles MD   Signed: 6/1/2022 5:47 PM   [facility]    [facility]        reviewed    ECG/EMG Results (most recent)     None        Pending    Assessment & Plan   Acute displaced mid left inferior pubic ramus fracture:  Transverse cortical acetabulum fracture:   Gait instability with history of frequent falls:  History of left femoral neck fracture s/p repair 2014:   Per Dr. Reed, no surgical intervention indicated  Consult PT/OT to evaluate  Control pain, add hydrocodone, baclofen, gabapentin    Hypertension:  Blood pressure controlled on Maxide, continue    Chronic pain on pain contract:  Chronic cervical myelopathy:  Don notes no gabapentin since 1/2022 but confirms hydrocodone use  Check UDS    Chronic urinary incontinence:  Rule out UTI with UA C&S  Hold home Myrbetriq    Tobacco abuse: Add nicotine patch    Hyperlipidemia: No current acute  "issues, add home Lipitor, aspirin    Depression: No current acute issues, add home Wellbutrin, Effexor    History of alcohol abuse: Reports no use, check ethanol level  Review of care everywhere shows admission with alcohol withdrawal/encephalopathy 6/2019    Chronic LE discoloration: noted back 6/2019 during that admission with normal ABIs/venous dopplers    I discussed the patient's findings and my recommendations with patient and staff.     Kaleigh Naqvi, TYRA  06/01/22  21:52 EDT      As of April 2021, as required by the Federal 21st Century Cures Act, medical records (including provider notes and laboratory/imaging results) are to be made available to patients and/or their designees as soon as the documents are signed/resulted. While the intention is to ensure transparency and to engage patients in their healthcare, this immediate access may create unintended consequences because this document uses language intended for communication between medical providers for interpretation with the entirety of the patient's clinical picture in mind. It is recommended that patients and/or their designees review all available information with their primary or specialist providers for explanation and to avoid misinterpretation of this information.     \"Dictated utilizing Dragon dictation\"      "

## 2022-06-02 NOTE — DISCHARGE PLACEMENT REQUEST
"Mary Doty (72 y.o. Female)             Date of Birth   1950    Social Security Number       Address   [address] [place], [place] [address]    Home Phone   [phone]    MRN   [id number]       Rastafarian   None    Marital Status   Single                            Admission Date   6/1/22    Admission Type   Emergency    Admitting Provider   Milena Romero MD    Attending Provider   Milena Romero MD    Department, Room/Bed   [facility] MED SURG, 1405/1       Discharge Date       Discharge Disposition       Discharge Destination                               Attending Provider: Milena Romero MD    Allergies: No Known Allergies    Isolation: None   Infection: None   Code Status: CPR   Advance Care Planning Activity    Ht: 157.5 cm (62\")   Wt: 54 kg (119 lb)    Admission Cmt: None   Principal Problem: None                Active Insurance as of 6/1/2022     Primary Coverage     Payor Plan Insurance Group Employer/Plan Group    HUMANA MEDICARE REPLACEMENT HUMANA MEDICARE REPLACEMENT [id number]     Payor Plan Address Payor Plan Phone Number Payor Plan Fax Number Effective Dates    [address] [address] [phone]  1/1/2022 - None Entered    [facility] [address]       Subscriber Name Subscriber Birth Date Member ID       MARY DOTY 1950 [id number]                 Emergency Contacts      (Rel.) Home Phone Work Phone Mobile Phone    CONTACT,NO (Other) [phone] -- --    Valentine Rangel (Daughter) [phone] -- --    SorenRonald (Son) [phone] -- --              "

## 2022-06-02 NOTE — THERAPY EVALUATION
Patient Name: Maru Doty  : 1950    MRN: [id number]                              Today's Date: 2022       Admit Date: 2022    Visit Dx:     ICD-10-CM ICD-9-CM   1. Closed fracture of ramus of left pubis, initial encounter ([facility])  S32.592A 808.2   2. Closed nondisplaced fracture of left acetabulum, unspecified portion of acetabulum, initial encounter ([facility])  S32.402A 808.0     Patient Active Problem List   Diagnosis   • Closed fracture of ramus of left pubis ([facility])     History reviewed. No pertinent past medical history.  History reviewed. No pertinent surgical history.   General Information     [facility] Name 22          Physical Therapy Time and Intention    Document Type evaluation  -     Mode of Treatment physical therapy  -[facility] Name 22          General Information    Patient Profile Reviewed yes  -     Prior Level of Function independent:;all household mobility;community mobility  -     Existing Precautions/Restrictions fall  -     Barriers to Rehab --  pain  -[facility] Name 22          Living Environment    People in Home alone  -[facility] Name 22 09          Home Main Entrance    Number of Stairs, Main Entrance none  1st floor apartment  -[facility] Name 22          Stairs Within Home, Primary    Number of Stairs, Within Home, Primary none  -[facility] Name 22          Cognition    Orientation Status (Cognition) oriented x 3  requires 2 attempts to report year correctly  -           User Key  (r) = Recorded By, (t) = Taken By, (c) = Cosigned By    Initials Name Provider Type     Tram Jacobs, PT Physical Therapist               Mobility     Row Name 22          Bed Mobility    Bed Mobility other (see comments)  pt seated on EOB upon arrival  -     Comment, (Bed Mobility) pt seated on EOB upon arrival  -[facility] Name 22          Transfers    Comment, (Transfers) cues for hand placement and  controlled descent into sitting.  Patient unable to obtain fully upright posture upon attempted standing due to pain  -[facility] Name 06/02/22 0943          Sit-Stand Transfer    Sit-Stand [place] (Transfers) moderate assist (50% patient effort);2 person assist  -     Assistive Device (Sit-Stand Transfers) walker, front-wheeled  -     Comment, (Sit-Stand Transfer) unable to obtain upright posture despite assistance x2  -[facility] Name 06/02/22 0943          Gait/Stairs (Locomotion)    Comment, (Gait/Stairs) unable to attempt steps forward due to repoted pain  -[facility] Name 06/02/22 0943          Mobility    Extremity Weight-bearing Status left lower extremity  -     Left Lower Extremity (Weight-bearing Status) weight-bearing as tolerated (WBAT)  -           User Key  (r) = Recorded By, (t) = Taken By, (c) = Cosigned By    Initials Name Provider Type    Tram Thurman PT Physical Therapist               Obj/Interventions     [facility] Name 06/02/22 0943          Range of Motion Comprehensive    Comment, General Range of Motion LE ROM WFL bilaterally  -JW     Row Name 06/02/22 0943          Strength Comprehensive (MMT)    Comment, General Manual Muscle Testing (MMT) Assessment strength not tested due to pain  -[facility] Name 06/02/22 0943          Balance    Balance Interventions sitting  -     Comment, Balance pt able to sit EOB independently  -[facility] Name 06/02/22 0943          Sensory Assessment (Somatosensory)    Sensory Assessment (Somatosensory) other (see comments)  no numbness/tingling reported  -           User Key  (r) = Recorded By, (t) = Taken By, (c) = Cosigned By    Initials Name Provider Type    Tram Thurman PT Physical Therapist               Goals/Plan     [facility] Name 06/02/22 0943          Bed Mobility Goal 1 (PT)    Activity/Assistive Device (Bed Mobility Goal 1, PT) bed mobility activities, all  -     Wilson Level/Cues Needed (Bed Mobility Goal 1, PT)  supervision required  -JW     Time Frame (Bed Mobility Goal 1, PT) 3 days  -JW     Progress/Outcomes (Bed Mobility Goal 1, PT) goal ongoing  -     Row Name 06/02/22 0943          Transfer Goal 1 (PT)    Activity/Assistive Device (Transfer Goal 1, PT) transfers, all  -JW     [place] Level/Cues Needed (Transfer Goal 1, PT) minimum assist (75% or more patient effort)  -JW     Time Frame (Transfer Goal 1, PT) 3 days  -JW     Progress/Outcome (Transfer Goal 1, PT) goal ongoing  -     Row Name 06/02/22 0943          Gait Training Goal 1 (PT)    Activity/Assistive Device (Gait Training Goal 1, PT) gait (walking locomotion);assistive device use  -JW     [place] Level (Gait Training Goal 1, PT) minimum assist (75% or more patient effort)  -JW     Distance (Gait Training Goal 1, PT) 10  -JW     Time Frame (Gait Training Goal 1, PT) 3 days  -JW     Progress/Outcome (Gait Training Goal 1, PT) goal ongoing  -[facility] Name 06/02/22 0943          Therapy Assessment/Plan (PT)    Planned Therapy Interventions (PT) balance training;bed mobility training;gait training;home exercise program;patient/family education;strengthening;transfer training  -           User Key  (r) = Recorded By, (t) = Taken By, (c) = Cosigned By    Initials Name Provider Type    Tram Thurman, PT Physical Therapist               Clinical Impression     Row Name 06/02/22 0943          Pain    Pretreatment Pain Rating 6/10  -     Posttreatment Pain Rating 6/10  -     Pre/Posttreatment Pain Comment reports pain in left pelvis area  -     Pain Intervention(s) Repositioned  -     Row Name 06/02/22 0943          Plan of Care Review    Plan of Care Reviewed With patient  -     Outcome Evaluation Physical therapy evaluation complete.  Patient sitting on EOB upon arrival.  Patient requires mod assist x2 to attempt sit to stand from elevated bed surface, however unable to obtain fully upright posture.  Patient unable to take side steps  or forward steps due to pain.  Patient will benefit from physical therapy to address deficits in functional mobility and activity tolerance.  Anticipate mobility will improve as pain levels decrease, recommend short term rehab at discharge.  -     Row Name 06/02/22 0943          Therapy Assessment/Plan (PT)    Patient/Family Therapy Goals Statement (PT) go home  -     Rehab Potential (PT) good, to achieve stated therapy goals  -     Criteria for Skilled Interventions Met (PT) yes;meets criteria  -     Therapy Frequency (PT) daily  -     Predicted Duration of Therapy Intervention (PT) 3 days  -     Row Name 06/02/22 0943          Positioning and Restraints    Pre-Treatment Position in bed  -     Post Treatment Position bed  -JW     In Bed notified nsg;call light within reach;encouraged to call for assist;sitting EOB  -           User Key  (r) = Recorded By, (t) = Taken By, (c) = Cosigned By    Initials Name Provider Type    Tram Thurman, PT Physical Therapist               Outcome Measures     Row Name 06/02/22 0943          How much help from another person do you currently need...    Turning from your back to your side while in flat bed without using bedrails? 3  -JW     Moving from lying on back to sitting on the side of a flat bed without bedrails? 3  -JW     Moving to and from a bed to a chair (including a wheelchair)? 1  -JW     Standing up from a chair using your arms (e.g., wheelchair, bedside chair)? 1  -JW     Climbing 3-5 steps with a railing? 1  -JW     To walk in hospital room? 1  -JW     AM-PAC 6 Clicks Score (PT) 10  -     Highest level of mobility 4 --> Transferred to chair/commode  -     Row Name 06/02/22 0943          Functional Assessment    Outcome Measure Options AM-PAC 6 Clicks Basic Mobility (PT)  -           User Key  (r) = Recorded By, (t) = Taken By, (c) = Cosigned By    Initials Name Provider Type    Tram Thurman, RICKIE Physical Therapist                              Physical Therapy Education                 Title: PT OT SLP Therapies (In Progress)     Topic: Physical Therapy (In Progress)     Point: Mobility training (Done)     Learning Progress Summary           Patient Acceptance, E,TB, VU by BHARGAV at 6/2/2022 1144                   Point: Home exercise program (Not Started)     Learner Progress:  Not documented in this visit.                      User Key     Initials Effective Dates Name Provider Type Discipline     06/16/21 -  Tram Jacobs, PT Physical Therapist PT              PT Recommendation and Plan  Planned Therapy Interventions (PT): balance training, bed mobility training, gait training, home exercise program, patient/family education, strengthening, transfer training  Plan of Care Reviewed With: patient  Outcome Evaluation: Physical therapy evaluation complete.  Patient sitting on EOB upon arrival.  Patient requires mod assist x2 to attempt sit to stand from elevated bed surface, however unable to obtain fully upright posture.  Patient unable to take side steps or forward steps due to pain.  Patient will benefit from physical therapy to address deficits in functional mobility and activity tolerance.  Anticipate mobility will improve as pain levels decrease, recommend short term rehab at discharge.     Time Calculation:    PT Charges     Row Name 06/02/22 1147             Time Calculation    Start Time 0943  -      Stop Time 1003  -      Time Calculation (min) 20 min  -BHARGAV      PT Received On 06/02/22  -BHARGAV      PT - Next Appointment 06/03/22  -BHARGAV            User Key  (r) = Recorded By, (t) = Taken By, (c) = Cosigned By    Initials Name Provider Type    Tram Thurman PT Physical Therapist              Therapy Charges for Today     Code Description Service Date Service Provider Modifiers Qty    52533927006 HC PT EVAL LOW COMPLEXITY 1 6/2/2022 Tram Jacobs, PT GP 1          PT G-Codes  Outcome Measure Options: AM-PAC 6 Clicks Basic Mobility  (PT)  AM-PAC 6 Clicks Score (PT): 10    Tram Jacobs, PT  6/2/2022

## 2022-06-02 NOTE — PLAN OF CARE
Goal Outcome Evaluation:           Progress: no change  Outcome Evaluation: pt arrived to unit alert & oriented x4. pt states her pain is controlled with combo of percocet & dilaudid 2 mg IV. pt reports she smokes 1/2 PPD--nicotine patched scheduled. pt says she has been using a walker @ home prior to this fall. unable to ambulate at this time due to pain. BLUE purple & mottled; +1 dorsalis pedis pulses palpated. RLE +2 edema. purewick in place. SCDS on & lovenox scheduled.

## 2022-06-02 NOTE — PROGRESS NOTES
Adult Nutrition  Assessment/PES    Patient Name:  Maru Doty  YOB: 1950  MRN: [id number]  Admit Date:  6/1/2022    Assessment Date:  6/2/2022    Comments:  Pt meets criteria for moderate malnutrition based on wt loss, muscle wasting, and fat loss on exam.   Pt declines snacks/supplements/shakes at visit today.   Encourage po and voice food prefs, pro each meal.  Will cont to follow and monitor.      Reason for Assessment     Row Name 06/02/22 1158          Reason for Assessment    Reason For Assessment identified at risk by screening criteria     Diagnosis trauma  s/p fall pelvic fx hx ETOH , chronic pain     Identified At Risk by Screening Criteria MST SCORE 2+                Nutrition/Diet History     Row Name 06/02/22 1158          Nutrition/Diet History    Typical Intake (Food/Fluid/EN/PN) Pt sititng up in bed legs to side. agreeable to visit/interview/exam. NKFA. Denies issue chew/swallow. Reports appetite been fair at home which is baseline, has only ever eaten 2 times per day not breakfast eater. Denies use of oral supplement or need for snacks at this time. Pt asked for diet drink provided at bedside. Pt reports new dentures bottom need to be adjusted need make appt, wearing uppers. Pt has noticed wt loss over past few months.                Anthropometrics     Row Name 06/02/22 1200          Anthropometrics    Weight --  119#     Additional Documentation --  125-130#, 11/2021 126.5# loss of 7.5# which is 6% BW                Labs/Tests/Procedures/Meds     Row Name 06/02/22 1159          Labs/Procedures/Meds    Lab Results Reviewed reviewed            Diagnostic Tests/Procedures    Diagnostic Test/Procedure Reviewed reviewed            Medications    Pertinent Medications Reviewed reviewed                Physical Findings     Row Name 06/02/22 1200          Physical Findings    Overall Physical Appearance temporal, clavicle, patella wasting noted, orbital, buccal, & upper arm fat loss  noted     Exam Agreement consented                Estimated/Assessed Needs - Anthropometrics     Row Name 06/02/22 1200          Anthropometrics    Weight --  119#     Additional Documentation --  125-130#, 11/2021 126.5# loss of 7.5# which is 6% BW            Estimated/Assessed Needs    Additional Documentation Estimated Calorie Needs (Group);Fluid Requirements (Group);Protein Requirements (Group)            Estimated Calorie Needs    Estimated Calorie Requirement (kcal/day) 1599-1377 kcal ( mifflin 1.25-1.4)     Estimated Calorie Need Method Labette-St Jeor            Protein Requirements    Est Protein Requirement Amount (gms/kg) 1.2 gm protein  64-81 gm pro ( 1.2-1.5 gm/kg pro )            Fluid Requirements    Estimated Fluid Requirement Method RDA Method  2036-1198 ml                Nutrition Prescription Ordered     Row Name 06/02/22 1202          Nutrition Prescription PO    Current PO Diet Regular                Evaluation of Received Nutrient/Fluid Intake     Row Name 06/02/22 1202          Fluid Intake Evaluation    Oral Fluid (mL) 240  insufficient data            PO Evaluation    Number of Meals 1     % PO Intake 50                  Malnutrition Severity Assessment     Row Name 06/02/22 1203          Malnutrition Severity Assessment    Malnutrition Type Chronic Disease - Related Malnutrition            Unintentional Weight Loss     Unintentional Weight Loss  --  6% wt loss in 6 months per EMR data            Muscle Loss    Temple Region Moderate - slight depression     Clavicle Bone Region Moderate - some protrusion in females, visible in males     Patellar Region Moderate - patella more prominent, less muscle definition around patella            Fat Loss    Orbital Region  Moderate -  somewhat hollowness, slightly dark circles     Upper Arm Region Severe - mostly skin, very little space between folds, fingers touch            Criteria Met (Must meet criteria for severity in at least 2 of these  categories: M Wasting, Fat Loss, Fluid, Secondary Signs, Wt. Status, Intake)    Patient meets criteria for  Moderate (non-severe) Malnutrition                 Problem/Interventions:   Problem 1     Row Name 06/02/22 1204          Nutrition Diagnoses Problem 1    Problem 1 Malnutrition     Etiology (related to) Factors Affecting Nutrition     Signs/Symptoms (evidenced by) Report/Observation  MSA, wt loss                      Intervention Goal     Row Name 06/02/22 1204          Intervention Goal    General Meet nutritional needs for age/condition     PO Tolerate PO;PO intake (%)     PO Intake % 50 %  or greater     Weight Maintain weight                Nutrition Intervention     Row Name 06/02/22 1204          Nutrition Intervention    RD/Tech Action Advise available snack;Interview for preference;Encourage intake;Supplement offered/refused;Follow Tx progress                  Education/Evaluation     Row Name 06/02/22 1204          Education    Education Other (comment)  spoke w pt about nutrition exam for muscle/fat loss, spoke about concern for nutrition w wt loss & signs of malnutrition. spoke about snacks between meals and oral supplement            Monitor/Evaluation    Education Follow-up Other (comment)  pt declines snack/supplement at this time                 Electronically signed by:  Nano Laureano RD  06/02/22 12:05 EDT

## 2022-06-02 NOTE — PROGRESS NOTES
"Hospital Medicine Team    LOS 0 days      Patient Care Team:  Wade Swann MD as PCP - General (Internal Medicine)          Chief Complaint:  pain    Subjective      Pain controlled better today less hip pain.  No other issues.  Review of Systems   Constitutional: Negative for fever.   Musculoskeletal: Positive for arthralgias.       Objective    Vital Signs  Temp:  [97.6 °F (36.4 °C)-98.8 °F (37.1 °C)] 97.8 °F (36.6 °C)  Heart Rate:  [] 93  Resp:  [16-18] 18  BP: ()/(66-83) 93/67  Oxygen Therapy  SpO2: 93 %  Pulse Oximetry Type: Intermittent  Device (Oxygen Therapy): room air  Flowsheet Rows    Flowsheet Row First Filed Value   Admission Height 157.5 cm (62\") Documented at 06/01/2022 1653   Admission Weight 54 kg (119 lb) Documented at 06/01/2022 1653              Physical Exam:  Physical Exam  Vitals reviewed.   Constitutional:       General: She is not in acute distress.  Cardiovascular:      Rate and Rhythm: Normal rate.   Pulmonary:      Effort: No respiratory distress.   Abdominal:      Palpations: Abdomen is soft.   Musculoskeletal:      Comments: TTP over left   Neurological:      Mental Status: She is alert.           Results Review:    I reviewed the patient's new clinical results.    Results from last 7 days   Lab Units 06/01/22 2110   WBC 10*3/mm3 8.91   HEMOGLOBIN g/dL 13.5   HEMATOCRIT % 42.1   PLATELETS 10*3/mm3 171     Results from last 7 days   Lab Units 06/01/22  2110   SODIUM mmol/L 135*   POTASSIUM mmol/L 4.3   CHLORIDE mmol/L 96*   CO2 mmol/L 25.1   BUN mg/dL 16   CREATININE mg/dL 0.75   CALCIUM mg/dL 9.6   BILIRUBIN mg/dL 0.7   ALK PHOS U/L 94   ALT (SGPT) U/L 24   AST (SGOT) U/L 31   GLUCOSE mg/dL 66           Microbiology Results (last 10 days)     Procedure Component Value - Date/Time    COVID PRE-OP / PRE-PROCEDURE SCREENING ORDER (NO ISOLATION) - Swab, Nasopharynx [[id number]]  (Normal) Collected: 06/01/22 2110    Lab Status: Final result Specimen: Swab from Nasopharynx " Updated: 06/01/22 2202    Narrative:      The following orders were created for panel order COVID PRE-OP / PRE-PROCEDURE SCREENING ORDER (NO ISOLATION) - Swab, Nasopharynx.  Procedure                               Abnormality         Status                     ---------                               -----------         ------                     COVID-19 and FLU A/B PCR...[[id number]]  Normal              Final result                 Please view results for these tests on the individual orders.    COVID-19 and FLU A/B PCR - Swab, Nasopharynx [[id number]]  (Normal) Collected: 06/01/22 2110    Lab Status: Final result Specimen: Swab from Nasopharynx Updated: 06/01/22 2202     COVID19 Not Detected     Influenza A PCR Not Detected     Influenza B PCR Not Detected    Narrative:      Fact sheet for providers: https://www.fda.gov/media/313225/download    Fact sheet for patients: https://www.fda.gov/media/711202/download    Test performed by PCR.                  CT Pelvis Without Contrast    Result Date: 6/1/2022  Slightly displaced mid left inferior pubic ramus fracture Subtle probable transverse cortical fracture involving the acetabulum. Millimeters anterior to the left hip prosthesis Possible minimal cortical interruption in the left suprapubic ramus near the pubic symphysis. These images are degraded by streak artifact Signer Name: Sean Valles MD  Signed: 6/1/2022 6:45 PM  Workstation Name: [facility]  Radiology [facility]    XR Hip With or Without Pelvis 2 - 3 View Left    Result Date: 6/1/2022  Findings suggest a subtle fracture involving the superior pubic ramus. There is a more conspicuous disruption of the inferior pubic ramus but it is difficult to tell if that represents an old injury or an acute injury. Consider CT imaging Signer Name: Sean Valles MD  Signed: 6/1/2022 5:47 PM  Workstation Name: [employer]  Radiology [facility]      ECG/EMG Results (most recent)     Procedure  Component Value Units Date/Time    ECG 12 Lead [[id number]] Collected: 06/01/22 2302     Updated: 06/02/22 0818     QT Interval 360 ms     Narrative:      HEART RATE= 93  bpm  RR Interval= 648  ms  CA Interval= 160  ms  P Horizontal Axis= 23  deg  P Front Axis= 51  deg  QRSD Interval= 90  ms  QT Interval= 360  ms  QRS Axis= 20  deg  T Wave Axis= 45  deg  - BORDERLINE ECG -  Sinus rhythm  Low voltage, extremity and precordial leads  NO PRIOR TRACING AVAILABLE FOR COMPARISON  Electronically Signed By: Ezequiel Gutierrez ([facility]) 02-Jun-2022 08:18:02  Date and Time of Study: 2022-06-01 23:02:32            X-rays, labs reviewed personally by physician.    Medication Review:   I have reviewed the patient's current medication list    Scheduled Meds  Diclofenac Sodium, 4 g, Topical, 4x Daily  enoxaparin, 40 mg, Subcutaneous, Q24H  gabapentin, 100 mg, Oral, Q8H  nicotine, 1 patch, Transdermal, Q24H  sodium chloride, 10 mL, Intravenous, Q12H        Meds Infusions  Pharmacy to Dose enoxaparin (LOVENOX),         Meds PRN  •  acetaminophen **OR** acetaminophen **OR** acetaminophen  •  HYDROcodone-acetaminophen  •  melatonin  •  ondansetron **OR** ondansetron  •  Pharmacy to Dose enoxaparin (LOVENOX)  •  [COMPLETED] Insert peripheral IV **AND** sodium chloride  •  sodium chloride  •  sodium chloride            Assessment & Plan  (MDM)    Active Hospital Problems:  Active Hospital Problems    Diagnosis  POA   • Moderate malnutrition (HCC) [E44.0]  Yes   • Closed fracture of ramus of left pubis ([facility]) [S32.592A]  Yes      Resolved Hospital Problems   No resolved problems to display.       Acute displaced mid left inferior pubic ramus fracture:  Transverse cortical acetabulum fracture:   Gait instability with history of frequent falls:  History of left femoral neck fracture s/p repair 2014:   Per Dr. Reed, no surgical intervention indicated  PT OT  Continue pain control     Hypertension:  Continue home medications monitor with vital  signs     Chronic pain on pain contract:  Chronic cervical myelopathy:  Don notes no gabapentin since 1/2022 but confirms hydrocodone use     Chronic urinary incontinence:  aSymptomatic bacteriuria, will follow-up urine culture but would not treat currently given asymptomatic     Tobacco abuse: Add nicotine patch     Hyperlipidemia: No current acute issues, add home Lipitor, aspirin     Depression: No current acute issues, add home Wellbutrin, Effexor     History of alcohol abuse:   -Alcohol level normal     Chronic LE discoloration: noted back 6/2019 during that admission with normal ABIs/venous dopplers       This patient has been examined wearing appropriate Personal Protective Equipment . 06/02/22      Plan for disposition: [facility] to [facility] and awaiting acceptance    Electronically signed by Hai Santa DO, 06/02/22, 16:23 EDT.

## 2022-06-02 NOTE — PLAN OF CARE
Goal Outcome Evaluation:  Plan of Care Reviewed With: patient           Outcome Evaluation: Physical therapy evaluation complete.  Patient sitting on EOB upon arrival.  Patient requires mod assist x2 to attempt sit to stand from elevated bed surface, however unable to obtain fully upright posture.  Patient unable to take side steps or forward steps due to pain.  Patient will benefit from physical therapy to address deficits in functional mobility and activity tolerance.  Anticipate mobility will improve as pain levels decrease, recommend short term rehab at discharge.

## 2022-06-02 NOTE — THERAPY EVALUATION
Acute Care - Occupational Therapy Initial Evaluation   [place]     Patient Name: Maru Doty  : 1950  MRN: [id number]  Today's Date: 2022  Onset of Illness/Injury or Date of Surgery: 22  Date of Referral to OT: 22  Referring Physician: Leesa MARY    Admit Date: 2022       ICD-10-CM ICD-9-CM   1. Closed fracture of ramus of left pubis, initial encounter ([facility])  S32.592A 808.2   2. Closed nondisplaced fracture of left acetabulum, unspecified portion of acetabulum, initial encounter ([facility])  S32.402A 808.0     Patient Active Problem List   Diagnosis   • Closed fracture of ramus of left pubis ([facility])     History reviewed. No pertinent past medical history.  History reviewed. No pertinent surgical history.      OT ASSESSMENT FLOWSHEET (last 12 hours)     OT Evaluation and Treatment     Row Name 22 0944                   OT Time and Intention    Subjective Information complains of;pain  -JJ        Document Type evaluation  -JJ        Mode of Treatment occupational therapy  -JJ        Patient Effort adequate  -JJ        Symptoms Noted During/After Treatment increased pain  -JJ                  General Information    Patient Profile Reviewed yes  -JJ        Onset of Illness/Injury or Date of Surgery 22  -JJ        Referring Physician Leesa MARY  -JJ        General Observations of Patient pt sitting EOB, agreed to evaluation  -JJ        Prior Level of Function independent:;all household mobility;transfer;ADL's  ambulates with RW at baseline  -JJ        Equipment Currently Used at Home shower chair;commode;cane, straight;walker, rolling  reacher  -JJ        Pertinent History of Current Functional Problem pt sustained L pelvic fx from mechanical fall at home. pt ambulates with RW at baseline and is I with adls and home management  -JJ        Existing Precautions/Restrictions fall  -JJ        Risks Reviewed patient:;increased discomfort  -JJ        Benefits  Reviewed patient:;improve function;increase independence;increase balance  -JJ        Barriers to Rehab --  pain  -JJ                  Living Environment    Current Living Arrangements apartment  no DENNYS  -JJ        People in Home alone  pt states son and daughter available to assist if needed  -J                  Pain Assessment    Pretreatment Pain Rating 6/10  -JJ        Posttreatment Pain Rating 6/10  -J        Pain Location - Side/Orientation Left  -        Pain Location - --  L pelvic area  -JJ        Pain Intervention(s) Repositioned;Medication (See MAR)  -J                  Cognition    Orientation Status (Cognition) oriented x 3  -JJ        Follows Commands (Cognition) [facility]  -        Personal Safety Interventions gait belt;nonskid shoes/slippers when out of bed  -                  Range of Motion (ROM)    Range of Motion bilateral upper extremities;ROM is [facility]  -                  Strength (Manual Muscle Testing)    Strength (Manual Muscle Testing) bilateral upper extremities;strength is [facility]  -                  Bathing Assessment/Intervention    Comment, (Bathing) anticipate pt will require mod assist for lb adls 2 pain  -JJ                  Bed Mobility    Comment, (Bed Mobility) deferred up at EOB  -                  Functional Mobility    Functional Mobility- Comment unable to attempt 2 pain  -JJ                  Transfer Assessment/Treatment    Transfers sit-stand transfer;stand-sit transfer  -                  Transfers    Sit-Stand [place] (Transfers) moderate assist (50% patient effort);2 person assist;verbal cues  -        Stand-Sit [place] (Transfers) moderate assist (50% patient effort);2 person assist;verbal cues  -J                  Sit-Stand Transfer    Assistive Device (Sit-Stand Transfers) walker, front-wheeled  -        Comment, (Sit-Stand Transfer) unable to achieve full upright posture x 2 attempts  -                  Stand-Sit Transfer    Assistive Device  (Stand-Sit Transfers) walker, front-wheeled  -                  Plan of Care Review    Plan of Care Reviewed With patient  -        Outcome Evaluation OT evaluation completed. pt required mod assist x 2 for functional transfers from EOB with RW x 2 attempts. pt unable to achieve full upright posture 2 pain. anticipate pt will require mod assist for lb adls. pt with decreased safety awareness, unaware of need for assist. pt would benefit from skilled OT services to address adl retraining, functional transfers and balance. anticipate pt will require STR prior to return home alone  -                  Positioning and Restraints    Pre-Treatment Position in bed  -        Post Treatment Position bed  -JJ        In Bed sitting EOB;call light within reach;notified nsg;encouraged to call for assist  -                  Therapy Assessment/Plan (OT)    Date of Referral to OT 06/02/22  -        Patient/Family Therapy Goal Statement (OT) pt states she wants to return home as soon as possible  -        OT Diagnosis decreased adl sp pelvic fx  -J        Rehab Potential (OT) good, to achieve stated therapy goals  -        Criteria for Skilled Therapeutic Interventions Met (OT) yes;skilled treatment is necessary  -        Therapy Frequency (OT) 5 times/wk  -        Predicted Duration of Therapy Intervention (OT) x 1 week  -        Problem List (OT) balance;pain;strength;mobility  -        Activity Limitations Related to Problem List (OT) unable to ambulate safely;unable to transfer safely;BADLs not performed adequately or safely;IADLs not performed adequately or safely  -        Planned Therapy Interventions (OT) adaptive equipment training;BADL retraining;functional balance retraining;transfer/mobility retraining  -                  Evaluation Complexity (OT)    Overall Complexity of Evaluation (OT) low complexity  -                  Therapy Plan Review/Discharge Plan (OT)    Anticipated Discharge  Disposition (OT) skilled nursing facility  -                  Transfer Goal 1 (OT)    Activity/Assistive Device (Transfer Goal 1, OT) toilet;commode, 3-in-1;walker, rolling  -JJ        [place] Level/Cues Needed (Transfer Goal 1, OT) standby assist  -JJ        Time Frame (Transfer Goal 1, OT) 1 week  -JJ        Progress/Outcome (Transfer Goal 1, OT) --  new goal  -J                  Dressing Goal 1 (OT)    Activity/Device (Dressing Goal 1, OT) lower body dressing  with long handled ae if needed  -JJ        [place]/Cues Needed (Dressing Goal 1, OT) standby assist  -JJ        Time Frame (Dressing Goal 1, OT) 1 week  -JJ        Progress/Outcome (Dressing Goal 1, OT) --  new goal  -J                  Balance Goal 1 (OT)    Activity/Assistive Device (Balance Goal 1, OT) standing, static;walker, rolling  -JJ        [place] Level/Cues Needed (Balance Goal 1, OT) standby assist  x 2-3 minutes to increase caregiver assist with adls  -JJ        Time Frame (Balance Goal 1, OT) 1 week  -JJ        Progress/Outcomes (Balance Goal 1, OT) --  new goal  -J              User Key  (r) = Recorded By, (t) = Taken By, (c) = Cosigned By    Initials Name Effective Dates    Leana Wyman, OTR 06/16/21 -                  Occupational Therapy Education                 Title: PT OT SLP Therapies (In Progress)     Topic: Occupational Therapy (In Progress)     Point: ADL training (Done)     Description:   Instruct learner(s) on proper safety adaptation and remediation techniques during self care or transfers.   Instruct in proper use of assistive devices.              Learning Progress Summary           Patient Acceptance, E,TB, VU by DAPHNE at 6/2/2022 1219    Comment: pt educated on adls, long handled ae and safety with functional transfers and balance                   Point: Precautions (Not Started)     Description:   Instruct learner(s) on prescribed precautions during self-care and functional transfers.               Learner Progress:  Not documented in this visit.                      User Key     Initials Effective Dates Name Provider Type Discipline     06/16/21 -  Leana Rene, OTR Occupational Therapist OT                  OT Recommendation and Plan  Planned Therapy Interventions (OT): adaptive equipment training, BADL retraining, functional balance retraining, transfer/mobility retraining  Therapy Frequency (OT): 5 times/wk  Plan of Care Review  Plan of Care Reviewed With: patient  Outcome Evaluation: OT evaluation completed. pt required mod assist x 2 for functional transfers from EOB with RW x 2 attempts. pt unable to achieve full upright posture 2 pain. anticipate pt will require mod assist for lb adls. pt with decreased safety awareness, unaware of need for assist. pt would benefit from skilled OT services to address adl retraining, functional transfers and balance. anticipate pt will require STR prior to return home alone  Plan of Care Reviewed With: patient  Outcome Evaluation: OT evaluation completed. pt required mod assist x 2 for functional transfers from EOB with RW x 2 attempts. pt unable to achieve full upright posture 2 pain. anticipate pt will require mod assist for lb adls. pt with decreased safety awareness, unaware of need for assist. pt would benefit from skilled OT services to address adl retraining, functional transfers and balance. anticipate pt will require STR prior to return home alone     Outcome Measures     Row Name 06/02/22 0944             How much help from another is currently needed...    Putting on and taking off regular lower body clothing? 2  -JJ      Bathing (including washing, rinsing, and drying) 2  -JJ      Toileting (which includes using toilet bed pan or urinal) 2  -JJ      Putting on and taking off regular upper body clothing 4  -JJ      Taking care of personal grooming (such as brushing teeth) 4  -JJ      Eating meals 4  -JJ      AM-PAC 6 Clicks Score (OT) 18   -              Functional Assessment    Outcome Measure Options AM-PAC 6 Clicks Daily Activity (OT)  -            User Key  (r) = Recorded By, (t) = Taken By, (c) = Cosigned By    Initials Name Provider Type    Leana Wyman OTR Occupational Therapist                Time Calculation:    Time Calculation- OT     Row Name 06/02/22 1222             Time Calculation- OT    OT Start Time 0943  -      OT Stop Time 0959  -      OT Time Calculation (min) 16 min  -            User Key  (r) = Recorded By, (t) = Taken By, (c) = Cosigned By    Initials Name Provider Type    Leana Wyman OTR Occupational Therapist              Therapy Charges for Today     Code Description Service Date Service Provider Modifiers Qty    [id number] HC OT EVAL LOW COMPLEXITY 1 6/2/2022 Leana Rene OTR GO 1               HONORIO Stahl  6/2/2022

## 2022-06-03 VITALS
HEART RATE: 95 BPM | RESPIRATION RATE: 16 BRPM | DIASTOLIC BLOOD PRESSURE: 54 MMHG | SYSTOLIC BLOOD PRESSURE: 91 MMHG | TEMPERATURE: 98 F | WEIGHT: 119 LBS | OXYGEN SATURATION: 93 % | HEIGHT: 62 IN | BODY MASS INDEX: 21.9 KG/M2

## 2022-06-03 LAB
ANION GAP SERPL CALCULATED.3IONS-SCNC: 11.4 MMOL/L (ref 5–15)
BACTERIA SPEC AEROBE CULT: NORMAL
BUN SERPL-MCNC: 13 MG/DL (ref 8–23)
BUN/CREAT SERPL: 24.1 (ref 7–25)
CALCIUM SPEC-SCNC: 9.4 MG/DL (ref 8.6–10.5)
CHLORIDE SERPL-SCNC: 103 MMOL/L (ref 98–107)
CO2 SERPL-SCNC: 24.6 MMOL/L (ref 22–29)
CREAT SERPL-MCNC: 0.54 MG/DL (ref 0.57–1)
DEPRECATED RDW RBC AUTO: 48.3 FL (ref 37–54)
EGFRCR SERPLBLD CKD-EPI 2021: 97.4 ML/MIN/1.73
ERYTHROCYTE [DISTWIDTH] IN BLOOD BY AUTOMATED COUNT: 12.7 % (ref 12.3–15.4)
GLUCOSE SERPL-MCNC: 113 MG/DL (ref 65–99)
HCT VFR BLD AUTO: 42.3 % (ref 34–46.6)
HGB BLD-MCNC: 13.2 G/DL (ref 12–15.9)
MAGNESIUM SERPL-MCNC: 2.4 MG/DL (ref 1.6–2.4)
MCH RBC QN AUTO: 32.1 PG (ref 26.6–33)
MCHC RBC AUTO-ENTMCNC: 31.2 G/DL (ref 31.5–35.7)
MCV RBC AUTO: 102.9 FL (ref 79–97)
PLATELET # BLD AUTO: 169 10*3/MM3 (ref 140–450)
PMV BLD AUTO: 10.4 FL (ref 6–12)
POTASSIUM SERPL-SCNC: 3.4 MMOL/L (ref 3.5–5.2)
RBC # BLD AUTO: 4.11 10*6/MM3 (ref 3.77–5.28)
SODIUM SERPL-SCNC: 139 MMOL/L (ref 136–145)
WBC NRBC COR # BLD: 6.45 10*3/MM3 (ref 3.4–10.8)

## 2022-06-03 PROCEDURE — 85027 COMPLETE CBC AUTOMATED: CPT | Performed by: INTERNAL MEDICINE

## 2022-06-03 PROCEDURE — 96372 THER/PROPH/DIAG INJ SC/IM: CPT

## 2022-06-03 PROCEDURE — 99217 PR OBSERVATION CARE DISCHARGE MANAGEMENT: CPT | Performed by: NURSE PRACTITIONER

## 2022-06-03 PROCEDURE — G0378 HOSPITAL OBSERVATION PER HR: HCPCS

## 2022-06-03 PROCEDURE — 80048 BASIC METABOLIC PNL TOTAL CA: CPT | Performed by: INTERNAL MEDICINE

## 2022-06-03 PROCEDURE — 25010000002 ENOXAPARIN PER 10 MG: Performed by: NURSE PRACTITIONER

## 2022-06-03 PROCEDURE — 83735 ASSAY OF MAGNESIUM: CPT | Performed by: NURSE PRACTITIONER

## 2022-06-03 RX ORDER — CHOLECALCIFEROL (VITAMIN D3) 125 MCG
5 CAPSULE ORAL NIGHTLY PRN
Start: 2022-06-03

## 2022-06-03 RX ORDER — ENOXAPARIN SODIUM 100 MG/ML
40 INJECTION SUBCUTANEOUS EVERY 24 HOURS
Start: 2022-06-03

## 2022-06-03 RX ORDER — NICOTINE 21 MG/24HR
1 PATCH, TRANSDERMAL 24 HOURS TRANSDERMAL
Start: 2022-06-03

## 2022-06-03 RX ORDER — ONDANSETRON 4 MG/1
4 TABLET, FILM COATED ORAL EVERY 6 HOURS PRN
Start: 2022-06-03

## 2022-06-03 RX ORDER — HYDROCODONE BITARTRATE AND ACETAMINOPHEN 7.5; 325 MG/1; MG/1
1 TABLET ORAL EVERY 4 HOURS PRN
Qty: 12 TABLET | Refills: 0 | Status: SHIPPED | OUTPATIENT
Start: 2022-06-03 | End: 2022-06-05

## 2022-06-03 RX ORDER — ACETAMINOPHEN 325 MG/1
650 TABLET ORAL EVERY 4 HOURS PRN
Start: 2022-06-03

## 2022-06-03 RX ORDER — POTASSIUM CHLORIDE 20 MEQ/1
40 TABLET, EXTENDED RELEASE ORAL ONCE
Status: COMPLETED | OUTPATIENT
Start: 2022-06-03 | End: 2022-06-03

## 2022-06-03 RX ORDER — GABAPENTIN 100 MG/1
100 CAPSULE ORAL EVERY 8 HOURS SCHEDULED
Qty: 2 CAPSULE | Refills: 0 | Status: SHIPPED | OUTPATIENT
Start: 2022-06-03 | End: 2022-06-05

## 2022-06-03 RX ADMIN — HYDROCODONE BITARTRATE AND ACETAMINOPHEN 1 TABLET: 7.5; 325 TABLET ORAL at 05:36

## 2022-06-03 RX ADMIN — POTASSIUM CHLORIDE 40 MEQ: 1500 TABLET, EXTENDED RELEASE ORAL at 08:54

## 2022-06-03 RX ADMIN — GABAPENTIN 100 MG: 100 CAPSULE ORAL at 05:34

## 2022-06-03 RX ADMIN — DICLOFENAC 4 G: 10 GEL TOPICAL at 08:55

## 2022-06-03 RX ADMIN — HYDROCODONE BITARTRATE AND ACETAMINOPHEN 1 TABLET: 7.5; 325 TABLET ORAL at 12:00

## 2022-06-03 RX ADMIN — ENOXAPARIN SODIUM 40 MG: 40 INJECTION SUBCUTANEOUS at 08:55

## 2022-06-03 RX ADMIN — DICLOFENAC 4 G: 10 GEL TOPICAL at 12:00

## 2022-06-03 RX ADMIN — Medication 1 PATCH: at 08:55

## 2022-06-03 NOTE — DISCHARGE SUMMARY
"Maru Doty  1950  [id number]    Hospitalists Discharge Summary    Date of Admission: 6/1/2022  Date of Discharge:  6/3/2022    History of Present Illness from [facility] on admit:   \"The patient is a 72 YOF who presented to the emergency department secondary to acute onset left groin pain after accidental fall on 5/31/2022.  Since then she has been unable to bear weight on the left leg due to pain.  She typically ambulates with a walker and was unable to ambulate in ER.  ER provider contacted Dr. Reed who noted no surgical intervention planned however if patient unable to bear weight and ambulate with walker, needed admission to assist. Patient notes she lives alone, does not drive.     Diagnostics in ER revealed slightly displaced mid left inferior pubic ramus fracture, transverse cortical fracture involving the acetabulum, see full report in epic     History of gait instability with frequent falls, cervical myelopathy, hypertension, hyperlipidemia, depression, chronic pain on pain contract, tobacco abuse, urinary incontinence, left femoral neck fracture 2014     She otherwise denies f/c/headache/rhinorrhea/nasal congestion/lightheadedness/syncopal sensation/cough/soa/n/v/d/chest pain/abdominal pain/recent illness/sick exposures/change in bowel or bladder habits/no weight change/bloody emesis or bloody stools/change in medications or any other new concerns.\"    Primary Discharge diagnoses:  Acute displaced mid left inferior pubic ramus fracture  Transverse cortical acetabulum fracture  Chronic gait instability with history of frequent falls    Secondary Discharge Diagnoses:   H/O left femoral neck fracture status postrepair 2014  Hypertension  Chronic urinary incontinence, rule out UTI with asymptomatic bacteriuria  Chronic cervical myelopathy with chronic pain on pain contract  Hyperlipidemia  Depression  Remote history of alcohol abuse  Chronic lower extremity discoloration  Tobacco abuse  Moderate " malnutrition    Hospital Course Summary:   The patient was followed in consultation by PT/OT with recommendation for SNF at discharge as family is unable to assist with care at home.  Pain was controlled with Norco 7.5 mg, diclofenac gel, gabapentin.  UTI is being ruled out at time of discharge with urine culture pending.  Chronic lower extremity discoloration: Normal ABIs and venous Dopplers in the past.  To SNF for rehab prior to returning home.  Recommend continued monitoring by dietitian at SNF  Follow-up PCP 1 week after discharge from facility    PCP  Patient Care Team:  Wade Swann MD as PCP - General (Internal Medicine)    Consults:   Consults     No orders found from 5/3/2022 to 6/2/2022.        Operations and Procedures Performed:     CT Pelvis Without Contrast    Result Date: 6/1/2022  Narrative: CT Pelvis WO INDICATION: Fall yesterday with left hip pain and abnormal plain films suggesting pubic rami fractures on the left TECHNIQUE: CT of the pelvis without IV contrast. Coronal and sagittal reconstructions were obtained.  Radiation dose reduction techniques included automated exposure control or exposure modulation based on body size. Count of known CT and cardiac nuc med studies performed in previous 12 months: 0. COMPARISON: Hip series earlier today FINDINGS: There is grade 1 anterior spondylolisthesis of L4 and L5. There are marked facet degenerative changes at L4-5. The sacrum is intact. There is a definite fracture through the inferior pubic ramus that is displaced about 5 mm. Is difficult to tell if there is a subtle cortical fracture in the left superior pubic ramus near the pubic symphysis because of streak artifact from the hip prosthesis. There appears be a small cortical fracture involving the acetabulum just anterior to the hip prosthesis on axial image 30 is nondisplaced.. There is no dislocation     Impression: Slightly displaced mid left inferior pubic ramus fracture Subtle probable  transverse cortical fracture involving the acetabulum. Millimeters anterior to the left hip prosthesis Possible minimal cortical interruption in the left suprapubic ramus near the pubic symphysis. These images are degraded by streak artifact Signer Name: Sean Valles MD  Signed: 6/1/2022 6:45 PM  Workstation [facility]: [facility]  [facility]    XR Hip With or Without Pelvis 2 - 3 View Left    Result Date: 6/1/2022  Narrative: CR Hip Uni Comp Min 2 Vws LT INDICATION: Fall yesterday with pain in left hip region. Patient had hip replacement 6 years ago. COMPARISON: None available. FINDINGS: AP and frog-leg lateral view(s) of the left hip.  The AP view of the right hip appears normal. On the left side there is irregularity within the inferior pubic ramus is a subtle step-off involving the superior pubic ramus where it meets the pubic symphysis. The left hip prosthesis is intact and there is no dislocation No bone erosion or destruction.      Impression: Findings suggest a subtle fracture involving the superior pubic ramus. There is a more conspicuous disruption of the inferior pubic ramus but it is difficult to tell if that represents an old injury or an acute injury. Consider CT imaging Signer Name: Sean Valles MD  Signed: 6/1/2022 5:47 PM  Workstation Name: [facility]  [facility]    Allergies:  has No Known Allergies.    Don  Hydrocodone 5/2022 per report, reviewed by me    Discharge Medications:     Discharge Medications      New Medications      Instructions Start Date   acetaminophen 325 MG tablet  Commonly known as: TYLENOL   650 mg, Oral, Every 4 Hours PRN      Diclofenac Sodium 1 % gel gel  Commonly known as: VOLTAREN   4 g, Topical, 4 Times Daily      Enoxaparin Sodium 40 MG/0.4ML solution prefilled syringe syringe  Commonly known as: LOVENOX   40 mg, Subcutaneous, Every 24 Hours      gabapentin 100 MG capsule  Commonly known as: NEURONTIN   100 mg, Oral, Every 8  Hours Scheduled      HYDROcodone-acetaminophen 7.5-325 MG per tablet  Commonly known as: NORCO   1 tablet, Oral, Every 4 Hours PRN      melatonin 5 MG tablet tablet   5 mg, Oral, Nightly PRN      nicotine 14 MG/24HR patch  Commonly known as: NICODERM CQ   1 patch, Transdermal, Every 24 Hours Scheduled      ondansetron 4 MG tablet  Commonly known as: ZOFRAN   4 mg, Oral, Every 6 Hours PRN             Last Lab Results:   Lab Results (most recent)     Procedure Component Value Units Date/Time    Urinalysis, Microscopic Only - Urine, Catheter In/Out [[id number]]  (Abnormal) Collected: 06/02/22 1455    Specimen: Urine, Catheter In/Out Updated: 06/02/22 1516     RBC, UA 6-12 /HPF      WBC, UA 6-12 /HPF      Bacteria, UA 2+ /HPF      Squamous Epithelial Cells, UA None Seen /HPF      Hyaline Casts, UA None Seen /LPF      Methodology Manual Light Microscopy    Urine Culture - Urine, Urine, Catheter In/Out [[id number]] Collected: 06/02/22 1455    Specimen: Urine, Catheter In/Out Updated: 06/02/22 1516    Urinalysis With Culture If Indicated - Urine, Catheter In/Out [[id number]]  (Abnormal) Collected: 06/02/22 1455    Specimen: Urine, Catheter In/Out Updated: 06/02/22 1500     Color, UA Yellow     Appearance, UA Cloudy     pH, UA 6.0     Specific Gravity, UA 1.025     Glucose, UA Negative     Ketones, UA 15 mg/dL (1+)     Bilirubin, UA Negative     Blood, UA Moderate (2+)     Protein, UA Negative     Leuk Esterase, UA Trace     Nitrite, UA Negative     Urobilinogen, UA 0.2 E.U./dL    Narrative:      In absence of clinical symptoms, the presence of pyuria, bacteria, and/or nitrites on the urinalysis result does not correlate with infection.    Urine Drug Screen - Urine, Clean Catch [[id number]]  (Abnormal) Collected: 06/02/22 0437    Specimen: Urine, Clean Catch Updated: 06/02/22 0455     THC, Screen, Urine Negative     Phencyclidine (PCP), Urine Negative     Cocaine Screen, Urine Negative     Methamphetamine, Ur Negative      Opiate Screen Positive     Amphetamine Screen, Urine Negative     Benzodiazepine Screen, Urine Negative     Tricyclic Antidepressants Screen Negative     Methadone Screen, Urine Negative     Barbiturates Screen, Urine Negative     Oxycodone Screen, Urine Positive     Propoxyphene Screen Negative     Buprenorphine, Screen, Urine Negative    Narrative:      Urine drug screen results are to be used for medical purposes only.  They are not to be used for legal purposes such as employment testing.  Negative results do not necessarily mean the complete absence of a subtance, but rather that the result is less than the cutoff for that substance.  Positive results are unconfirmed and considered Preliminary Positive.  [facility] does not automatically confirm Postitive Unconfirmed results.  The physician may request (order) an Unconfirmed Positive result to be sent out for confirmation.      Negative Thresholds for Drugs Screened:    THC screen, urine                          50 ng/ml  Phenycyclidine (PCP), urine                25 ng/ml  Cocaine screen, urine                     150 ng/ml  Methamphetamine, urine                    500 ng/ml  Opiate screen, urine                      100 ng/ml  Amphetamine screen, urine                 500 ng/ml  Benzodiazepine screen, urine              150 ng/ml  Tricyclic Antidepressants screen, urine   300 ng/ml  Methadone screen, urine                   200 ng/ml  Barbiturates screen, urine                200 ng/ml  Oxycodone screen, urine                   100 ng/ml  Propoxyphene screen, urine                300 ng/ml  Buprenorphine screen, urine                10 ng/ml    Troponin [[id number]]  (Normal) Collected: 06/01/22 2201    Specimen: Blood Updated: 06/01/22 2250     Troponin T <0.010 ng/mL     Narrative:      Troponin T Reference Range:  <= 0.03 ng/mL-   Negative for AMI  >0.03 ng/mL-     Abnormal for myocardial necrosis.  Clinicians would have to utilize clinical  acumen, EKG, Troponin and serial changes to determine if it is an Acute Myocardial Infarction or myocardial injury due to an underlying chronic condition.       Results may be falsely decreased if patient taking Biotin.      TSH [[id number]]  (Normal) Collected: 06/01/22 2201    Specimen: Blood Updated: 06/01/22 2250     TSH 1.600 uIU/mL     Protime-INR [[id number]]  (Normal) Collected: 06/01/22 2201    Specimen: Blood Updated: 06/01/22 2233     Protime 13.5 Seconds      INR 1.01    Narrative:      Therapeutic Ranges for INR: 2.0-3.0 (PT 20-30)                              2.5-3.5 (PT 25-34)    Ethanol [[id number]] Collected: 06/01/22 2201    Specimen: Blood Updated: 06/01/22 2220     Ethanol <10 mg/dL      Ethanol % <0.010 %     COVID PRE-OP / PRE-PROCEDURE SCREENING ORDER (NO ISOLATION) - Swab, Nasopharynx [[id number]]  (Normal) Collected: 06/01/22 2110    Specimen: Swab from Nasopharynx Updated: 06/01/22 2202    Narrative:      The following orders were created for panel order COVID PRE-OP / PRE-PROCEDURE SCREENING ORDER (NO ISOLATION) - Swab, Nasopharynx.  Procedure                               Abnormality         Status                     ---------                               -----------         ------                     COVID-19 and FLU A/B PCR...[[id number]]  Normal              Final result                 Please view results for these tests on the individual orders.    COVID-19 and FLU A/B PCR - Swab, Nasopharynx [[id number]]  (Normal) Collected: 06/01/22 2110    Specimen: Swab from Nasopharynx Updated: 06/01/22 2202     COVID19 Not Detected     Influenza A PCR Not Detected     Influenza B PCR Not Detected    Narrative:      Fact sheet for providers: https://www.fda.gov/media/950513/download    Fact sheet for patients: https://www.fda.gov/media/034440/download    Test performed by PCR.    Comprehensive Metabolic Panel [[id number]]  (Abnormal) Collected: 06/01/22 2110    Specimen: Blood Updated: 06/01/22 2140      Glucose 66 mg/dL      BUN 16 mg/dL      Creatinine 0.75 mg/dL      Sodium 135 mmol/L      Potassium 4.3 mmol/L      Comment: Slight hemolysis detected by analyzer. Results may be affected.        Chloride 96 mmol/L      CO2 25.1 mmol/L      Calcium 9.6 mg/dL      Total Protein 7.0 g/dL      Albumin 4.60 g/dL      ALT (SGPT) 24 U/L      AST (SGOT) 31 U/L      Comment: Slight hemolysis detected by analyzer. Results may be affected.        Alkaline Phosphatase 94 U/L      Total Bilirubin 0.7 mg/dL      Globulin 2.4 gm/dL      A/G Ratio 1.9 g/dL      BUN/Creatinine Ratio 21.3     Anion Gap 13.9 mmol/L      eGFR 84.2 mL/min/1.73      Comment: National Kidney Foundation and American Society of Nephrology (ASN) Task Force recommended calculation based on the Chronic Kidney Disease Epidemiology Collaboration (CKD-EPI) equation refit without adjustment for race.       Narrative:      GFR Normal >60  Chronic Kidney Disease <60  Kidney Failure <15      CBC & Differential [[id number]]  (Abnormal) Collected: 06/01/22 2110    Specimen: Blood Updated: 06/01/22 2120    Narrative:      The following orders were created for panel order CBC & Differential.  Procedure                               Abnormality         Status                     ---------                               -----------         ------                     CBC Auto Differential[[id number]]        Abnormal            Final result                 Please view results for these tests on the individual orders.    CBC Auto Differential [[id number]]  (Abnormal) Collected: 06/01/22 2110    Specimen: Blood Updated: 06/01/22 2120     WBC 8.91 10*3/mm3      RBC 4.18 10*6/mm3      Hemoglobin 13.5 g/dL      Hematocrit 42.1 %      .7 fL      MCH 32.3 pg      MCHC 32.1 g/dL      RDW 13.2 %      RDW-SD 48.5 fl      MPV 10.3 fL      Platelets 171 10*3/mm3      Neutrophil % 72.8 %      Lymphocyte % 16.9 %      Monocyte % 8.5 %      Eosinophil % 0.9 %      Basophil % 0.6 %       Immature Grans % 0.3 %      Neutrophils, Absolute 6.48 10*3/mm3      Lymphocytes, Absolute 1.51 10*3/mm3      Monocytes, Absolute 0.76 10*3/mm3      Eosinophils, Absolute 0.08 10*3/mm3      Basophils, Absolute 0.05 10*3/mm3      Immature Grans, Absolute 0.03 10*3/mm3      nRBC 0.0 /100 WBC         Imaging Results (Most Recent)     Procedure Component Value Units Date/Time    CT Pelvis Without Contrast [[id number]] Collected: 06/01/22 1845     Updated: 06/01/22 1847    Narrative:      CT Pelvis WO    INDICATION:   Fall yesterday with left hip pain and abnormal plain films suggesting pubic rami fractures on the left    TECHNIQUE:   CT of the pelvis without IV contrast. Coronal and sagittal reconstructions were obtained.  Radiation dose reduction techniques included automated exposure control or exposure modulation based on body size. Count of known CT and cardiac nuc med studies  performed in previous 12 months: 0.     COMPARISON:   Hip series earlier today    FINDINGS:  There is grade 1 anterior spondylolisthesis of L4 and L5. There are marked facet degenerative changes at L4-5. The sacrum is intact. There is a definite fracture through the inferior pubic ramus that is displaced about 5 mm. Is difficult to tell if there  is a subtle cortical fracture in the left superior pubic ramus near the pubic symphysis because of streak artifact from the hip prosthesis. There appears be a small cortical fracture involving the acetabulum just anterior to the hip prosthesis on axial  image 30 is nondisplaced.. There is no dislocation      Impression:      Slightly displaced mid left inferior pubic ramus fracture    Subtle probable transverse cortical fracture involving the acetabulum. Millimeters anterior to the left hip prosthesis    Possible minimal cortical interruption in the left suprapubic ramus near the pubic symphysis. These images are degraded by streak artifact          Signer Name: Sean Valles MD   Signed: 6/1/2022  6:45 PM   Workstation [facility]: [facility]    [facility]    XR Hip With or Without Pelvis 2 - 3 View Left [[id number]] Collected: 06/01/22 1747     Updated: 06/01/22 1749    Narrative:      CR Hip Uni Comp Min 2 Vws LT    INDICATION:   Fall yesterday with pain in left hip region. Patient had hip replacement 6 years ago.    COMPARISON:   None available.    FINDINGS:  AP and frog-leg lateral view(s) of the left hip.  The AP view of the right hip appears normal. On the left side there is irregularity within the inferior pubic ramus is a subtle step-off involving the superior pubic ramus where it meets the pubic  symphysis. The left hip prosthesis is intact and there is no dislocation No bone erosion or destruction.        Impression:      Findings suggest a subtle fracture involving the superior pubic ramus. There is a more conspicuous disruption of the inferior pubic ramus but it is difficult to tell if that represents an old injury or an acute injury. Consider CT imaging    Signer Name: Sean Valles MD   Signed: 6/1/2022 5:47 PM   Workstation [facility]: [facility]    [facility]          PROCEDURES: None      Condition on Discharge: Stable    Physical Exam at Discharge  Vital Signs  Temp:  [97.7 °F (36.5 °C)-99.6 °F (37.6 °C)] 99.6 °F (37.6 °C)  Heart Rate:  [83-93] 83  Resp:  [16-18] 16  BP: ()/(65-80) 102/65   Body mass index is 21.77 kg/m².    Physical Exam  Vitals reviewed.   Constitutional:       General: She is not in acute distress.     Appearance: Normal appearance. She is not ill-appearing.   HENT:      Head: Normocephalic and atraumatic.      Mouth/Throat:      Mouth: Mucous membranes are moist.   Eyes:      Extraocular Movements: Extraocular movements intact.      Pupils: Pupils are equal, round, and reactive to light.   Cardiovascular:      Rate and Rhythm: Normal rate and regular rhythm.   Pulmonary:      Effort: Pulmonary effort is normal.      Breath sounds:  Normal breath sounds.   Abdominal:      General: Abdomen is flat. Bowel sounds are normal. There is no distension.      Palpations: Abdomen is soft.      Tenderness: There is no abdominal tenderness. There is no guarding.   Musculoskeletal:         General: No swelling.   Skin:     General: Skin is warm and dry.      Capillary Refill: Capillary refill takes less than 2 seconds.      Comments: Bilateral feet purplish discoloration and fungal toenail overgrowth   Neurological:      General: No focal deficit present.      Mental Status: She is alert and oriented to person, place, and time.   Psychiatric:         Mood and Affect: Mood normal.         Behavior: Behavior normal.       Discharge Disposition  SNF    Nurse:    Yes     PT/OT:  Yes     Safety Evaluation:  Yes     DME  TBD    Discharge Diet:      Dietary Orders (From admission, onward)     Start     Ordered    06/01/22 2225  Diet Regular  Diet Effective Now        Question:  Diet Texture / Consistency  Answer:  Regular    06/01/22 2224                Activity at Discharge:  As tolerated    Pre-discharge education  Smoking, medications, follow up    Follow-up Appointments  No future appointments.  Additional Instructions for the Follow-ups that You Need to Schedule     Discharge Follow-up with PCP   As directed       Currently Documented PCP:    Wade Swann MD    PCP Phone Number:    [phone]     Follow Up Details: 1 week after discharge from facility               Test Results Pending at Discharge: to be f/u at SNF  Pending Labs     Order Current Status    Urine Culture - Urine, Urine, Catheter In/Out In process           TYRA Colbert  06/03/22  07:14 EDT    Time: Discharge 30 min (if over 30 minutes give explanation as to why it took greater than 30 minutes)    As of April 2021, as required by the Federal 21st Century Cures Act, medical records (including provider notes and laboratory/imaging results) are to be made available to patients  "and/or their designees as soon as the documents are signed/resulted. While the intention is to ensure transparency and to engage patients in their healthcare, this immediate access may create unintended consequences because this document uses language intended for communication between medical providers for interpretation with the entirety of the patient's clinical picture in mind. It is recommended that patients and/or their designees review all available information with their primary or specialist providers for explanation and to avoid misinterpretation of this information.     \"Dictated utilizing Dragon dictation\"      "

## 2022-06-03 NOTE — PLAN OF CARE
Goal Outcome Evaluation:  Plan of Care Reviewed With: patient        Progress: improving  Outcome Evaluation: Pt's pain to left hip/pelvis controlled with PRN 7.5 mg Q4h.  Pt has not rated her pain more than a 5/10 and admits her pain is better today.  Pt is tolerating a regular diet and has been up to the chair this afternoon.  Pt is continent of B/B.  Nicotine patch to left shoulder.  Receives Lovenox and wears SCDs in bed.  Dentures in place.  Voltaren gel ordered and applied for left knee pain.  BLE noted to be mottled in appearance and this is the baseline for the pt for the past 6 years according to the pt.  Pt uses a walker at home.  Pt is a possible discharge to [facility] tomorrow.  Pt is RA.  Call light within reach.  Pt is very pleasant.  Bed in lowest position.

## 2022-06-03 NOTE — PLAN OF CARE
Goal Outcome Evaluation:  Plan of Care Reviewed With: patient        Progress: no change  Outcome Evaluation: r/a. No IV access. Norco given for pain with temporary relief. Voltaren gel to left knee. 2-3 people to transfer so decided to use a purewik for the rest of the shift. Voiding per purewik. LLE remains more swollen than RLE. Waiting for placement.

## 2022-06-03 NOTE — CASE MANAGEMENT/SOCIAL WORK
Continued Stay Note  KODY Padron     Patient Name: Maru Doty  MRN: [id number]  Today's Date: 6/3/2022    Admit Date: 6/1/2022     Discharge Plan     Row Name 06/03/22 0851       Plan    Plan [facility]    Patient/Family in Agreement with Plan yes    Plan Comments Message received from Ana at [facility] stating that they have received the auth from patient's insurance and they can accept. CM followed up with patient and informed her of such and she is agreeable to this discharge plan. CM left a  for [facility] informing her that patient will be discharging today. CM will continue to follow for needs.               Discharge Codes    No documentation.               Expected Discharge Date and Time     Expected Discharge Date Expected Discharge Time    Quentin 3, 2022             Nguyen Padilla RN

## 2022-06-04 NOTE — CASE MANAGEMENT/SOCIAL WORK
Case Management Discharge Note      Final Note: dc to [facility]    Provided Post Acute Provider List?: Yes  Post Acute Provider List: Home Health, Inpatient Rehab, Nursing Home  Provided Post Acute Provider Quality & Resource List?: Yes  Post Acute Provider Quality and Resource List: Home Health, Inpatient Rehab, Nursing Home  Delivered To: Patient  Method of Delivery: In person    Selected Continued Care - Discharged on 6/3/2022 Admission date: 6/1/2022 - Discharge disposition: Rehab Facility or Unit (DC - External)    Destination Coordination complete.    Service Provider Selected Services Address Phone Fax Patient Preferred    [facility]  Skilled Nursing [address] [place] [id number] [phone] --          Durable Medical Equipment    No services have been selected for the patient.              Dialysis/Infusion    No services have been selected for the patient.              Home Medical Care    No services have been selected for the patient.              Therapy    No services have been selected for the patient.              Community Resources    No services have been selected for the patient.              Community & DME    No services have been selected for the patient.                       Final Discharge Disposition Code: 03 - skilled nursing facility (SNF)

## 2023-10-22 ENCOUNTER — HOSPITAL ENCOUNTER (EMERGENCY)
Facility: HOSPITAL | Age: 73
Discharge: ANOTHER HOSPITAL | End: 2023-10-22
Attending: EMERGENCY MEDICINE | Admitting: EMERGENCY MEDICINE
Payer: MEDICARE

## 2023-10-22 ENCOUNTER — APPOINTMENT (OUTPATIENT)
Dept: CT IMAGING | Facility: HOSPITAL | Age: 73
End: 2023-10-22
Payer: MEDICARE

## 2023-10-22 VITALS
BODY MASS INDEX: 20.98 KG/M2 | HEIGHT: 62 IN | RESPIRATION RATE: 18 BRPM | SYSTOLIC BLOOD PRESSURE: 114 MMHG | HEART RATE: 90 BPM | WEIGHT: 114 LBS | TEMPERATURE: 98.4 F | DIASTOLIC BLOOD PRESSURE: 79 MMHG | OXYGEN SATURATION: 97 %

## 2023-10-22 DIAGNOSIS — S12.101A CLOSED NONDISPLACED FRACTURE OF SECOND CERVICAL VERTEBRA, UNSPECIFIED FRACTURE MORPHOLOGY, INITIAL ENCOUNTER: Primary | ICD-10-CM

## 2023-10-22 LAB
ALBUMIN SERPL-MCNC: 4.7 G/DL (ref 3.5–5.2)
ALBUMIN/GLOB SERPL: 1.5 G/DL
ALP SERPL-CCNC: 105 U/L (ref 39–117)
ALT SERPL W P-5'-P-CCNC: 20 U/L (ref 1–33)
ANION GAP SERPL CALCULATED.3IONS-SCNC: 14 MMOL/L (ref 5–15)
APTT PPP: 30.5 SECONDS (ref 24.3–38.1)
AST SERPL-CCNC: 22 U/L (ref 1–32)
BASOPHILS # BLD AUTO: 0.07 10*3/MM3 (ref 0–0.2)
BASOPHILS NFR BLD AUTO: 0.9 % (ref 0–1.5)
BILIRUB SERPL-MCNC: 0.4 MG/DL (ref 0–1.2)
BUN SERPL-MCNC: 10 MG/DL (ref 8–23)
BUN/CREAT SERPL: 15.6 (ref 7–25)
CALCIUM SPEC-SCNC: 9.9 MG/DL (ref 8.6–10.5)
CHLORIDE SERPL-SCNC: 100 MMOL/L (ref 98–107)
CO2 SERPL-SCNC: 26 MMOL/L (ref 22–29)
CREAT SERPL-MCNC: 0.64 MG/DL (ref 0.57–1)
DEPRECATED RDW RBC AUTO: 45.1 FL (ref 37–54)
EGFRCR SERPLBLD CKD-EPI 2021: 92.9 ML/MIN/1.73
EOSINOPHIL # BLD AUTO: 0.12 10*3/MM3 (ref 0–0.4)
EOSINOPHIL NFR BLD AUTO: 1.6 % (ref 0.3–6.2)
ERYTHROCYTE [DISTWIDTH] IN BLOOD BY AUTOMATED COUNT: 12.7 % (ref 12.3–15.4)
GLOBULIN UR ELPH-MCNC: 3.1 GM/DL
GLUCOSE SERPL-MCNC: 91 MG/DL (ref 65–99)
HCT VFR BLD AUTO: 45.4 % (ref 34–46.6)
HGB BLD-MCNC: 14.9 G/DL (ref 12–15.9)
IMM GRANULOCYTES # BLD AUTO: 0.02 10*3/MM3 (ref 0–0.05)
IMM GRANULOCYTES NFR BLD AUTO: 0.3 % (ref 0–0.5)
INR PPP: 1 (ref 0.9–1.1)
LYMPHOCYTES # BLD AUTO: 1.93 10*3/MM3 (ref 0.7–3.1)
LYMPHOCYTES NFR BLD AUTO: 25.5 % (ref 19.6–45.3)
MCH RBC QN AUTO: 31.8 PG (ref 26.6–33)
MCHC RBC AUTO-ENTMCNC: 32.8 G/DL (ref 31.5–35.7)
MCV RBC AUTO: 97 FL (ref 79–97)
MONOCYTES # BLD AUTO: 0.72 10*3/MM3 (ref 0.1–0.9)
MONOCYTES NFR BLD AUTO: 9.5 % (ref 5–12)
NEUTROPHILS NFR BLD AUTO: 4.7 10*3/MM3 (ref 1.7–7)
NEUTROPHILS NFR BLD AUTO: 62.2 % (ref 42.7–76)
NRBC BLD AUTO-RTO: 0 /100 WBC (ref 0–0.2)
PLATELET # BLD AUTO: 266 10*3/MM3 (ref 140–450)
PMV BLD AUTO: 9.2 FL (ref 6–12)
POTASSIUM SERPL-SCNC: 3.6 MMOL/L (ref 3.5–5.2)
PROT SERPL-MCNC: 7.8 G/DL (ref 6–8.5)
PROTHROMBIN TIME: 13.2 SECONDS (ref 12.1–15)
RBC # BLD AUTO: 4.68 10*6/MM3 (ref 3.77–5.28)
SODIUM SERPL-SCNC: 140 MMOL/L (ref 136–145)
WBC NRBC COR # BLD: 7.56 10*3/MM3 (ref 3.4–10.8)

## 2023-10-22 PROCEDURE — 85730 THROMBOPLASTIN TIME PARTIAL: CPT | Performed by: EMERGENCY MEDICINE

## 2023-10-22 PROCEDURE — 72125 CT NECK SPINE W/O DYE: CPT

## 2023-10-22 PROCEDURE — 85025 COMPLETE CBC W/AUTO DIFF WBC: CPT | Performed by: EMERGENCY MEDICINE

## 2023-10-22 PROCEDURE — 80053 COMPREHEN METABOLIC PANEL: CPT | Performed by: EMERGENCY MEDICINE

## 2023-10-22 PROCEDURE — 99284 EMERGENCY DEPT VISIT MOD MDM: CPT

## 2023-10-22 PROCEDURE — 85610 PROTHROMBIN TIME: CPT | Performed by: EMERGENCY MEDICINE

## 2023-10-22 NOTE — ED NOTES
Called [phone] to give RN to RN report, Rodri Rn reports he will call this RN back once he is out of a pt's room

## 2023-10-22 NOTE — ED PROVIDER NOTES
Subjective   History of Present Illness  73-year-old female presents with neck pain.  Patient states that 1 week ago she lost her balance while using her walker and fell backwards striking her head on the ground.  She has had constant pain to her neck since that time.  Patient reports that she had multilevel fusion to her neck and upper thoracic spine performed about 6 or 7 years ago and has hardware to the region in question.  States the pain has made it difficult to sleep.  She has chronic pain at baseline for which she takes hydrocodone but states it has been worse over the past week.  She also has some baseline numbness to her lower extremities which is unchanged since the incident.  No headaches.  No nausea or vomiting.  No visual changes.  No chest pain or shortness of breath.  No injuries to extremities.  Patient describes her falls as just losing her balance which she also states is not unusual for her.  She denies any kind of prodrome or loss of consciousness.      Review of Systems   All other systems reviewed and are negative.      History reviewed. No pertinent past medical history.    No Known Allergies    History reviewed. No pertinent surgical history.    History reviewed. No pertinent family history.    Social History     Socioeconomic History    Marital status: Single   Tobacco Use    Smoking status: Every Day     Packs/day: .5     Types: Cigarettes   Substance and Sexual Activity    Alcohol use: Yes     Comment: SOCIALLY    Drug use: Never    Sexual activity: Defer           Objective   Physical Exam  Constitutional:       General: She is not in acute distress.     Appearance: She is not toxic-appearing.   HENT:      Head: Normocephalic and atraumatic.      Nose: Nose normal.      Mouth/Throat:      Mouth: Mucous membranes are moist.      Pharynx: Oropharynx is clear.   Eyes:      Extraocular Movements: Extraocular movements intact.      Pupils: Pupils are equal, round, and reactive to light.    Neck:      Comments: Range of motion of neck decreased by pain but patient is able to slightly flex, extend, rotate left and right.  Tenderness to palpation at low cervical and upper thoracic region diffusely.  No crepitus.  No gross deformity.  Cardiovascular:      Rate and Rhythm: Normal rate and regular rhythm.   Pulmonary:      Effort: Pulmonary effort is normal. No respiratory distress.      Breath sounds: Normal breath sounds.   Abdominal:      General: There is no distension.      Palpations: Abdomen is soft.      Tenderness: There is no abdominal tenderness. There is no right CVA tenderness or left CVA tenderness.   Musculoskeletal:      Comments: Extremities within normal limits.  Patient has diffuse tenderness to cervical spine region with most pronounced over lowermost cervical vertebrae.  No gross deformities or crepitus.   Skin:     General: Skin is warm and dry.      Comments: Intact   Neurological:      General: No focal deficit present.      Mental Status: She is alert and oriented to person, place, and time. Mental status is at baseline.      Cranial Nerves: No cranial nerve deficit.      Sensory: No sensory deficit.      Motor: No weakness.      Coordination: Coordination normal.   Psychiatric:         Mood and Affect: Mood normal.         Behavior: Behavior normal.         Thought Content: Thought content normal.         Judgment: Judgment normal.         Procedures           ED Course  ED Course as of 10/22/23 1750   Sun Oct 22, 2023   1747 Patient with C2 fracture.  This appears to be nondisplaced but does involve transverse foramina.  She is neuro intact on exam and reports that her neuropathy is at its baseline.  She sustained the injury 1 week ago and has been walking around without any more limitation than what she has at baseline, ambulatory with a walker.  Patient placed in c-collar and baseline labs including coags have been drawn.  Discussed case with Sabine Wright on-call for  neurosurgery at [facility].  That is who patient is previously established with for spine surgery.  She recommends admission to hospitalist service and case was discussed with them as well.  Dr. Solomon accepts. [TD]      ED Course User Index  [TD] Tramaine Barnes MD                                           Medical Decision Making  Problems Addressed:  Closed nondisplaced fracture of second cervical vertebra, unspecified fracture morphology, initial encounter: complicated acute illness or injury    Amount and/or Complexity of Data Reviewed  Labs: ordered.  Radiology: ordered.        Final diagnoses:   Closed nondisplaced fracture of second cervical vertebra, unspecified fracture morphology, initial encounter       ED Disposition  ED Disposition       ED Disposition   Transfer to Another Facility     Condition   --    Comment   --               No follow-up provider specified.       Medication List      No changes were made to your prescriptions during this visit.            Tramaine Barnes MD  10/22/23 9451

## 2023-10-22 NOTE — NURSING NOTE
"..Nursing report ED to floor  Maru Doty  73 y.o.  female    HPI :   Chief Complaint   Patient presents with    Fall    Neck Pain       Admitting doctor:   No admitting provider for patient encounter.    Admitting diagnosis:   The encounter diagnosis was Closed nondisplaced fracture of second cervical vertebra, unspecified fracture morphology, initial encounter.    Code status:   Current Code Status       Date Active Code Status Order ID Comments User Context       Prior            Allergies:   Patient has no known allergies.    Isolation:   No active isolations    Intake and Output  No intake or output data in the 24 hours ending 10/22/23 1848    Weight:       10/22/23  1604   Weight: 51.7 kg (114 lb)       Most recent vitals:   Vitals:    10/22/23 1604   BP: 114/79   BP Location: Right arm   Patient Position: Sitting   Pulse: 90   Resp: 18   Temp: 98.4 °F (36.9 °C)   TempSrc: Oral   SpO2: 97%   Weight: 51.7 kg (114 lb)   Height: 157.5 cm (62\")       Active LDAs/IV Access:   Lines, Drains & Airways       Active LDAs       Name Placement date Placement time Site Days    Peripheral IV 10/22/23 1822 Left Forearm 10/22/23  1822  Forearm  less than 1                    Labs (abnormal labs have a star):   Labs Reviewed   PROTIME-INR - Normal    Narrative:     Therapeutic Ranges for INR: 2.0-3.0 (PT 20-30)                              2.5-3.5 (PT 25-34)   APTT - Normal    Narrative:     PTT = The equivalent PTT values for the therapeutic range of heparin levels at 0.1 to 0.7 U/ml are 53 to 110 seconds.     CBC WITH AUTO DIFFERENTIAL - Normal   COMPREHENSIVE METABOLIC PANEL    Narrative:     GFR Normal >60  Chronic Kidney Disease <60  Kidney Failure <15    The GFR formula is only valid for adults with stable renal function between ages 18 and 70.   CBC AND DIFFERENTIAL    Narrative:     The following orders were created for panel order CBC & Differential.  Procedure                               Abnormality         " Status                     ---------                               -----------         ------                     CBC Auto Differential[[id number]]        Normal              Final result                 Please view results for these tests on the individual orders.       EKG:   No orders to display       Meds given in ED:   Medications - No data to display    Imaging results:  CT Cervical Spine Without Contrast    Result Date: 10/22/2023   Nondisplaced bilateral C2 pars interarticularis fractures (Hangman fracture) with somewhat atypical appearance on the right where there is involvement of the posterior aspect of the C2 vertebral body. There is extension through the right transverse foramina. Consider CTA of the neck for evaluation of right vertebral artery integrity.  Surgical changes of C5-T2 posterior decompression/fusion without evidence of hardware complication. Degenerative findings as above.   This report was finalized on 10/22/2023 5:01 PM by Lasha York MD.       Ambulatory status:   -amb with assist    Social issues:   Social History     Socioeconomic History    Marital status: Single   Tobacco Use    Smoking status: Every Day     Packs/day: .5     Types: Cigarettes   Substance and Sexual Activity    Alcohol use: Yes     Comment: SOCIALLY    Drug use: Never    Sexual activity: Defer       NIH Stroke Scale:         Wade Casillas RN  10/22/23 18:48 EDT

## 2024-08-27 ENCOUNTER — APPOINTMENT (OUTPATIENT)
Dept: GENERAL RADIOLOGY | Facility: HOSPITAL | Age: 74
End: 2024-08-27
Payer: MEDICARE

## 2024-08-27 ENCOUNTER — HOSPITAL ENCOUNTER (EMERGENCY)
Facility: HOSPITAL | Age: 74
Discharge: HOME | End: 2024-08-27
Attending: EMERGENCY MEDICINE | Admitting: EMERGENCY MEDICINE
Payer: MEDICARE

## 2024-08-27 VITALS
RESPIRATION RATE: 18 BRPM | SYSTOLIC BLOOD PRESSURE: 102 MMHG | BODY MASS INDEX: 20.24 KG/M2 | TEMPERATURE: 98.2 F | OXYGEN SATURATION: 99 % | HEIGHT: 62 IN | WEIGHT: 110 LBS | HEART RATE: 88 BPM | DIASTOLIC BLOOD PRESSURE: 70 MMHG

## 2024-08-27 DIAGNOSIS — S43.401A SPRAIN OF RIGHT SHOULDER, UNSPECIFIED SHOULDER SPRAIN TYPE, INITIAL ENCOUNTER: ICD-10-CM

## 2024-08-27 DIAGNOSIS — S22.31XA CLOSED FRACTURE OF ONE RIB OF RIGHT SIDE, INITIAL ENCOUNTER: Primary | ICD-10-CM

## 2024-08-27 DIAGNOSIS — M89.9 DISORDER OF SCAPULA: ICD-10-CM

## 2024-08-27 PROCEDURE — 99283 EMERGENCY DEPT VISIT LOW MDM: CPT | Performed by: EMERGENCY MEDICINE

## 2024-08-27 PROCEDURE — 73030 X-RAY EXAM OF SHOULDER: CPT

## 2024-08-27 PROCEDURE — 73010 X-RAY EXAM OF SHOULDER BLADE: CPT

## 2024-08-27 RX ORDER — NAPROXEN 250 MG/1
500 TABLET ORAL ONCE
Status: COMPLETED | OUTPATIENT
Start: 2024-08-27 | End: 2024-08-27

## 2024-08-27 RX ORDER — NAPROXEN 500 MG/1
500 TABLET ORAL 2 TIMES DAILY PRN
Qty: 20 TABLET | Refills: 0 | Status: SHIPPED | OUTPATIENT
Start: 2024-08-27

## 2024-08-27 RX ADMIN — NAPROXEN 500 MG: 250 TABLET ORAL at 14:31

## 2024-08-27 NOTE — DISCHARGE INSTRUCTIONS
Rest and increase fluids.  Take medications as directed.  Use a pillow to support yourself to cough and deep breathe.  Return to the emergency department if symptoms get worse or change.  Follow-up with your primary care for recheck in the next 3 to 5 days.

## 2024-08-27 NOTE — ED PROVIDER NOTES
"Subjective   History of Present Illness  74-year-old  female patient presented to the emergency department via private vehicle with a chief complaint of fall with injury.  Patient states she was walking up a step when she slipped and fell.  She states she landed on her right shoulder/scapula area and her occipital region.  She states that she has a \"goose egg\" on her head that she is having significant right shoulder/scapula pain.  She states that she does fall frequently.  She states she tends to fall to the right when she falls.  Patient states she took some Tylenol prior to arrival.    History provided by:  Medical records, relative and patient      Review of Systems   Constitutional: Negative.    HENT: Negative.     Respiratory: Negative.     Cardiovascular: Negative.    Gastrointestinal: Negative.    Genitourinary: Negative.    Musculoskeletal: Negative.  Positive for arthralgias and joint swelling. Negative for back pain, gait problem, myalgias, neck pain and neck stiffness.   Skin: Negative.    Neurological: Negative.    Psychiatric/Behavioral: Negative.     All other systems reviewed and are negative.      History reviewed. No pertinent past medical history.    No Known Allergies    Past Surgical History:   Procedure Laterality Date    BACK SURGERY         History reviewed. No pertinent family history.    Social History     Socioeconomic History    Marital status: Single   Tobacco Use    Smoking status: Every Day     Current packs/day: 0.50     Types: Cigarettes   Substance and Sexual Activity    Alcohol use: Yes     Comment: SOCIALLY    Drug use: Never    Sexual activity: Defer           Objective   Physical Exam  Vitals and nursing note reviewed.   Constitutional:       Appearance: Normal appearance. She is normal weight.   HENT:      Head: Normocephalic.      Jaw: There is normal jaw occlusion.        Right Ear: External ear normal.      Left Ear: External ear normal.      Nose: Nose normal.      " Mouth/Throat:      Mouth: Mucous membranes are moist.      Pharynx: Oropharynx is clear.   Eyes:      Extraocular Movements: Extraocular movements intact.      Conjunctiva/sclera: Conjunctivae normal.      Pupils: Pupils are equal, round, and reactive to light.   Cardiovascular:      Rate and Rhythm: Normal rate and regular rhythm.      Pulses: Normal pulses.      Heart sounds: Normal heart sounds.   Pulmonary:      Effort: Pulmonary effort is normal.      Breath sounds: Normal breath sounds.   Abdominal:      General: Bowel sounds are normal.      Palpations: Abdomen is soft.   Musculoskeletal:         General: Normal range of motion.      Right shoulder: Swelling, tenderness and bony tenderness present. No deformity, effusion or laceration. Normal strength.      Left shoulder: Normal.        Arms:       Cervical back: Normal range of motion and neck supple.   Skin:     General: Skin is warm and dry.      Capillary Refill: Capillary refill takes less than 2 seconds.   Neurological:      General: No focal deficit present.      Mental Status: She is alert and oriented to person, place, and time.   Psychiatric:         Mood and Affect: Mood normal.         Behavior: Behavior normal.         Thought Content: Thought content normal.         Judgment: Judgment normal.         Procedures           ED Course                                             Medical Decision Making  Differential diagnosis includes fracture, dislocation, sprain, strain, SLAP injury, rotator cuff tear, closed head injury, abrasion, contusion, hematoma, and soft tissue injury.    XR Shoulder 2+ View Right    Result Date: 8/27/2024  Impression: Acute appearing mildly displaced right posterior lateral fourth rib fracture. Irregular appearance of the inferior scapular body as well as the distal right clavicle, both of which may be chronic. No prior imaging available for comparison. This could be further evaluated with CT. No evidence of dislocation.  Electronically Signed: Lasha York MD  8/27/2024 3:01 PM EDT  Workstation ID: [id number]    XR Scapula Right    Result Date: 8/27/2024  Impression: Acute appearing mildly displaced right posterior lateral fourth rib fracture. Irregular appearance of the inferior scapular body as well as the distal right clavicle, both of which may be chronic. No prior imaging available for comparison. This could be further evaluated with CT. No evidence of dislocation. Electronically Signed: Lasha York MD  8/27/2024 3:01 PM EDT  Workstation ID: [id number]      Discussed imaging and assessment findings with patient and son.  Patient has an abrasion to the posterior scalp.  Patient does have a mildly displaced right posterior lateral fourth rib fracture.  She has what appears to be old chronic changes of the scapula and the clavicle.  Patient was instructed to follow-up with her PCP.  Patient was instructed to take medications as directed.  Patient was instructed to use a pillow for cough and deep breathing.  Patient should return to the emergency department if symptoms get worse or change.  Patient and son both understand and agree to discharge plan.    Problems Addressed:  Closed fracture of one rib of right side, initial encounter: complicated acute illness or injury  Disorder of scapula: complicated acute illness or injury  Sprain of right shoulder, unspecified shoulder sprain type, initial encounter: complicated acute illness or injury    Amount and/or Complexity of Data Reviewed  Radiology: ordered. Decision-making details documented in ED Course.    Risk  Prescription drug management.        Final diagnoses:   Closed fracture of one rib of right side, initial encounter   Sprain of right shoulder, unspecified shoulder sprain type, initial encounter   Disorder of scapula       ED Disposition  ED Disposition       ED Disposition   Discharge    Condition   Stable    Comment   --               Wade Swann MD  [address] [address] [address] [phone]    Schedule an appointment as soon as possible for a visit in 3 days      [facility] EMERGENCY DEPARTMENT  [address] [facility] [facility] [address]   [place] [address] [phone]    If symptoms worsen         Medication List        New Prescriptions      naproxen 500 MG tablet  Commonly known as: NAPROSYN  Take 1 tablet by mouth 2 (Two) Times a Day As Needed for Mild Pain or Moderate Pain.               Where to Get Your Medications        These medications were sent to [id number] [place] [address] [facility] [address] [facility] [address] - [phone] PH - [phone] [address] [place] [address]      Phone: [phone]   naproxen 500 MG tablet            John Abreu, APRN  08/27/24 4856

## 2024-12-14 ENCOUNTER — APPOINTMENT (OUTPATIENT)
Dept: CT IMAGING | Facility: HOSPITAL | Age: 74
End: 2024-12-14
Payer: MEDICARE

## 2024-12-14 ENCOUNTER — HOSPITAL ENCOUNTER (EMERGENCY)
Facility: HOSPITAL | Age: 74
Discharge: HOME | End: 2024-12-15
Attending: STUDENT IN AN ORGANIZED HEALTH CARE EDUCATION/TRAINING PROGRAM
Payer: MEDICARE
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
## 2024-12-14 DIAGNOSIS — S00.12XA PERIORBITAL ECCHYMOSIS OF LEFT EYE, INITIAL ENCOUNTER: Primary | ICD-10-CM

## 2024-12-14 DIAGNOSIS — S12.130D CLOSED HANGMAN'S FRACTURE WITH ROUTINE HEALING, SUBSEQUENT ENCOUNTER: ICD-10-CM

## 2024-12-14 LAB
BASOPHILS # BLD AUTO: 0.06 10*3/MM3 (ref 0–0.2)
BASOPHILS NFR BLD AUTO: 0.6 % (ref 0–1.5)
DEPRECATED RDW RBC AUTO: 48 FL (ref 37–54)
EOSINOPHIL # BLD AUTO: 0.08 10*3/MM3 (ref 0–0.4)
EOSINOPHIL NFR BLD AUTO: 0.8 % (ref 0.3–6.2)
ERYTHROCYTE [DISTWIDTH] IN BLOOD BY AUTOMATED COUNT: 13 % (ref 12.3–15.4)
HCT VFR BLD AUTO: 40.8 % (ref 34–46.6)
HGB BLD-MCNC: 13.1 G/DL (ref 12–15.9)
IMM GRANULOCYTES # BLD AUTO: 0.03 10*3/MM3 (ref 0–0.05)
IMM GRANULOCYTES NFR BLD AUTO: 0.3 % (ref 0–0.5)
LYMPHOCYTES # BLD AUTO: 1.96 10*3/MM3 (ref 0.7–3.1)
LYMPHOCYTES NFR BLD AUTO: 18.6 % (ref 19.6–45.3)
MCH RBC QN AUTO: 32.1 PG (ref 26.6–33)
MCHC RBC AUTO-ENTMCNC: 32.1 G/DL (ref 31.5–35.7)
MCV RBC AUTO: 100 FL (ref 79–97)
MONOCYTES # BLD AUTO: 1.29 10*3/MM3 (ref 0.1–0.9)
MONOCYTES NFR BLD AUTO: 12.3 % (ref 5–12)
NEUTROPHILS NFR BLD AUTO: 67.4 % (ref 42.7–76)
NEUTROPHILS NFR BLD AUTO: 7.11 10*3/MM3 (ref 1.7–7)
NRBC BLD AUTO-RTO: 0 /100 WBC (ref 0–0.2)
PLATELET # BLD AUTO: 248 10*3/MM3 (ref 140–450)
PMV BLD AUTO: 9.6 FL (ref 6–12)
RBC # BLD AUTO: 4.08 10*6/MM3 (ref 3.77–5.28)
WBC NRBC COR # BLD AUTO: 10.53 10*3/MM3 (ref 3.4–10.8)

## 2024-12-14 PROCEDURE — 70450 CT HEAD/BRAIN W/O DYE: CPT

## 2024-12-14 PROCEDURE — 81001 URINALYSIS AUTO W/SCOPE: CPT | Performed by: STUDENT IN AN ORGANIZED HEALTH CARE EDUCATION/TRAINING PROGRAM

## 2024-12-14 PROCEDURE — 72125 CT NECK SPINE W/O DYE: CPT

## 2024-12-14 PROCEDURE — 99284 EMERGENCY DEPT VISIT MOD MDM: CPT | Performed by: STUDENT IN AN ORGANIZED HEALTH CARE EDUCATION/TRAINING PROGRAM

## 2024-12-14 PROCEDURE — 82550 ASSAY OF CK (CPK): CPT | Performed by: STUDENT IN AN ORGANIZED HEALTH CARE EDUCATION/TRAINING PROGRAM

## 2024-12-14 PROCEDURE — 85025 COMPLETE CBC W/AUTO DIFF WBC: CPT | Performed by: STUDENT IN AN ORGANIZED HEALTH CARE EDUCATION/TRAINING PROGRAM

## 2024-12-14 PROCEDURE — 80053 COMPREHEN METABOLIC PANEL: CPT | Performed by: STUDENT IN AN ORGANIZED HEALTH CARE EDUCATION/TRAINING PROGRAM

## 2024-12-15 VITALS
OXYGEN SATURATION: 94 % | SYSTOLIC BLOOD PRESSURE: 111 MMHG | DIASTOLIC BLOOD PRESSURE: 74 MMHG | BODY MASS INDEX: 20.24 KG/M2 | HEART RATE: 93 BPM | TEMPERATURE: 99.5 F | WEIGHT: 110 LBS | HEIGHT: 62 IN | RESPIRATION RATE: 16 BRPM

## 2024-12-15 LAB
ALBUMIN SERPL-MCNC: 4.1 G/DL (ref 3.5–5.2)
ALBUMIN/GLOB SERPL: 1.5 G/DL
ALP SERPL-CCNC: 100 U/L (ref 39–117)
ALT SERPL W P-5'-P-CCNC: 27 U/L (ref 1–33)
AMORPH URATE CRY URNS QL MICRO: ABNORMAL /HPF
ANION GAP SERPL CALCULATED.3IONS-SCNC: 11 MMOL/L (ref 5–15)
APTT PPP: 34 SECONDS (ref 24.3–38.1)
AST SERPL-CCNC: 23 U/L (ref 1–32)
BACTERIA UR QL AUTO: ABNORMAL /HPF
BILIRUB SERPL-MCNC: 0.4 MG/DL (ref 0–1.2)
BILIRUB UR QL STRIP: NEGATIVE
BUN SERPL-MCNC: 8 MG/DL (ref 8–23)
BUN/CREAT SERPL: 16.7 (ref 7–25)
CALCIUM SPEC-SCNC: 9.3 MG/DL (ref 8.6–10.5)
CHLORIDE SERPL-SCNC: 99 MMOL/L (ref 98–107)
CK SERPL-CCNC: 162 U/L (ref 20–180)
CLARITY UR: CLEAR
CO2 SERPL-SCNC: 26 MMOL/L (ref 22–29)
COLOR UR: ABNORMAL
CREAT SERPL-MCNC: 0.48 MG/DL (ref 0.57–1)
EGFRCR SERPLBLD CKD-EPI 2021: 98.9 ML/MIN/1.73
GLOBULIN UR ELPH-MCNC: 2.7 GM/DL
GLUCOSE SERPL-MCNC: 92 MG/DL (ref 65–99)
GLUCOSE UR STRIP-MCNC: NEGATIVE MG/DL
HGB UR QL STRIP.AUTO: ABNORMAL
HYALINE CASTS UR QL AUTO: ABNORMAL /LPF
INR PPP: 0.93 (ref 0.9–1.1)
KETONES UR QL STRIP: NEGATIVE
LEUKOCYTE ESTERASE UR QL STRIP.AUTO: NEGATIVE
NITRITE UR QL STRIP: NEGATIVE
PH UR STRIP.AUTO: 7 [PH] (ref 4.5–8)
POTASSIUM SERPL-SCNC: 3.6 MMOL/L (ref 3.5–5.2)
PROT SERPL-MCNC: 6.8 G/DL (ref 6–8.5)
PROT UR QL STRIP: NEGATIVE
PROTHROMBIN TIME: 12.9 SECONDS (ref 12.1–15)
RBC # UR STRIP: ABNORMAL /HPF
REF LAB TEST METHOD: ABNORMAL
SODIUM SERPL-SCNC: 136 MMOL/L (ref 136–145)
SP GR UR STRIP: 1.02 (ref 1–1.03)
SQUAMOUS #/AREA URNS HPF: ABNORMAL /HPF
UROBILINOGEN UR QL STRIP: ABNORMAL
WBC # UR STRIP: ABNORMAL /HPF

## 2024-12-15 PROCEDURE — 85610 PROTHROMBIN TIME: CPT | Performed by: STUDENT IN AN ORGANIZED HEALTH CARE EDUCATION/TRAINING PROGRAM

## 2024-12-15 PROCEDURE — 85730 THROMBOPLASTIN TIME PARTIAL: CPT | Performed by: STUDENT IN AN ORGANIZED HEALTH CARE EDUCATION/TRAINING PROGRAM

## 2024-12-15 NOTE — DISCHARGE INSTRUCTIONS

## 2024-12-15 NOTE — ED PROVIDER NOTES
"Subjective   History of Present Illness  This is a 74 old who presents to the emergency department after a fall several days ago.  The patient has some complaints of some bruising to the left eye and forehead.  She denies pain to the cervical spine, numbness, tingling, loss of sensation to the upper or lower extremities.  She states that she lives alone and is otherwise capable of performing her daily functions.  Unsure if she takes a blood thinner, she says that her son will know the information.  Son is not present at bedside on initial evaluation.  The patient is able to provide a full history.  She does not fully remember the event but states that she fell hit her head and \"got up afterwards and kept Going.\"      Review of Systems   Constitutional:  Negative for activity change, appetite change, diaphoresis and fatigue.   All other systems reviewed and are negative.      Past Medical History:   Diagnosis Date    Acute blood loss anemia     Ataxia     Balance problems     Cervical myelopathy     Chronic pain disorder     COPD (chronic obstructive pulmonary disease)     Depression     Encephalopathies     Fusion of spine of cervical region     High cholesterol     Hypertension     Lymphocytic colitis     Mixed incontinence     Multiple falls     Osteopenia     Overactive bladder     Pneumonia     Spinal stenosis, lumbar     Vitamin D deficiency        Allergies   Allergen Reactions    Sulfa Antibiotics Nausea And Vomiting       Past Surgical History:   Procedure Laterality Date    BACK SURGERY      CERVICAL CHIARI DECOMPRESSION POSTERIOR      CERVICAL SPINE SURGERY      fusion    TOTAL HIP ARTHROPLASTY Left     anterior approach       History reviewed. No pertinent family history.    Social History     Socioeconomic History    Marital status: Single   Tobacco Use    Smoking status: Every Day     Current packs/day: 0.50     Types: Cigarettes   Substance and Sexual Activity    Alcohol use: Yes     Comment: SOCIALLY "    Drug use: Never    Sexual activity: Defer           Objective   Physical Exam  Vitals and nursing note reviewed.   Constitutional:       General: She is not in acute distress.     Appearance: Normal appearance. She is not ill-appearing, toxic-appearing or diaphoretic.   HENT:      Head:      Comments: Large periorbital ecchymoses on the left that extends up to the left forehead.  No crepitus, small hematoma to the left forehead, no scalp laceration or abrasion.  No proptosis of the eye, no hyphema.  No evidence of entrapment or crepitus periorbitally.  No septal hematoma.  No hemotympanum     Right Ear: Tympanic membrane and external ear normal.      Left Ear: Tympanic membrane and external ear normal.      Nose: Nose normal. No congestion or rhinorrhea.      Mouth/Throat:      Mouth: Mucous membranes are moist.   Eyes:      Conjunctiva/sclera: Conjunctivae normal.      Pupils: Pupils are equal, round, and reactive to light.   Cardiovascular:      Rate and Rhythm: Normal rate and regular rhythm.      Pulses: Normal pulses.   Pulmonary:      Effort: Pulmonary effort is normal. No respiratory distress.      Breath sounds: Normal breath sounds. No stridor. No wheezing, rhonchi or rales.   Abdominal:      General: There is no distension.   Musculoskeletal:         General: No swelling or deformity. Normal range of motion.      Cervical back: Normal range of motion and neck supple. No rigidity.   Skin:     General: Skin is warm.      Coloration: Skin is not pale.      Comments: Mottling and purpleish hue to the bilateral lower extremities worse on the left than the right, there is some mild swelling to the dorsal foot on the left, on palpation the patient does have appropriate capillary refill however do have some concern for peripheral vascular disease and in the setting of discoloration of the patient's lower extremities, pulses are palpable at 2+ bilateral lower extremity at the dorsalis pedis and posterior tibial.     Neurological:      General: No focal deficit present.      Mental Status: She is alert and oriented to person, place, and time. Mental status is at baseline.   Psychiatric:         Mood and Affect: Mood normal.         Thought Content: Thought content normal.         Procedures           ED Course                                                       Medical Decision Making    MDM:    Escalation of care including admission/observation considered    - Discussions of management with other providers:  None    - Discussed/reviewed with Radiology regarding test interpretation    - Independent interpretation: Labs and CT    - Additional patient history obtained from: [facility]    - Review of external non-ED record (if available):  Prior Inpt record, Office record, Outpt record, Prior Outpt labs, PCP record, Outside ED record, Other    - Chronic conditions affecting care: See HPI and medical Hx.    - Social Determinants of health significantly affecting care:  None        Medical Decision Making Discussion:    This is a 74-year-old who presents after a fall a couple of days ago hitting the left side of her head, she has some periorbital ecchymoses and left frontal ecchymoses of the scalp.  No crepitus palpated or laceration. CT head shows no acute change or traumatic issue.  On CT scan of the cervical spine there is a stable nondisplaced fracture of C2 with involvement of the right transverse foramen this appears stable and was present prior based off of information since 2023.  She does have a neurosurgeon.  Out of an abundance of precaution I will speak with radiology for verification.    01:01- Spoke with Dr. Bowers of radiology to verify this is an old Hangman's fracture. This is old and stable, per his view since 2023.     The patient does have a strong smell of urine and appears somewhat unkempt.  Her son who is with her states that she falls a couple of times a year and has had longstanding issues with early dementia.   She also has a history of vitamin B12 deficiency which she takes supplements for.  The patient states that she is compliant with her medications and she states that she feels fine lives alone and is able to perform her activities of daily living without difficulty.  The patient does not have evidence of urinary tract infection on workup, her labs are stable, CK is within normal limits.  Based off of chart review, she has cervical spinal fusion several years ago and since that time, the patient and her son both maintain that she has had some peripheral lower extremity swelling and discoloration of the legs.  They state that this has been unchanged for the last 10 years.  She does have good strong pulses and the compartments are soft, I see no evidence of necrosis, gangrene or loss in sensation.  These are simply discolored and mildly swollen in the lower extremities worse on the left.  The patient has no evidence of extremity abnormality otherwise.  She has recurrent falls based off of chart review and the send maintains that this is fairly standard for her.  After review, the patient does not appear to have any significant evidence of abnormality, traumatic injury or other lab abnormality on workup.  She remains hemodynamically stable, is alert and oriented at her baseline and I feel that she is stable at this time for discharge with follow-up to her PCP.  There is no evidence of other issue and the patient would like to go home.  I feel that this is reasonable.  Stable for discharge.    The patient has been given very strict return precautions to return to the emergency department should there be any acute change or worsening of their condition.  I have explained my findings and the patient has expressed understanding to me.  I explained that the work-up performed in the ED has been based on the specific complaint and concern, as the nature of emergency medicine is complaint driven and they understand that new  symptoms may arise.  I have told them that, should there be any new symptoms, worsening or changing symptoms, a new work-up may be indicated that they are encouraged to return to the emergency department or promptly contact their primary care physician. We have employed a shared decision-making process as the discussion of their disposition.  The patient has been educated as to the nature of the visit, the tests and work-up performed and the findings from today's visit. At this time, there does not appear to be any acute emergent process that necessitates admission to the hospital, however, the patient understands that this can change unexpectedly. At this time, the patient is stable for discharge home and agrees to follow-up with her primary care physician in the next 24 to 48 hours or earlier should they be able to obtain an appointment.    The patient was counseled regarding diagnostic results and treatment plan and patient has indicated understanding of these instructions.      Problems Addressed:  Closed hangman's fracture with routine healing, subsequent encounter: complicated acute illness or injury  Fall, initial encounter: complicated acute illness or injury  Periorbital ecchymosis of left eye, initial encounter: complicated acute illness or injury    Amount and/or Complexity of Data Reviewed  Labs: ordered.  Radiology: ordered.        Final diagnoses:   Periorbital ecchymosis of left eye, initial encounter   Fall, initial encounter   Closed hangman's fracture with routine healing, subsequent encounter       ED Disposition  ED Disposition       ED Disposition   Discharge    Condition   Good    Comment   --               Wade Swann MD  [address] [address]  [phone]               Medication List      No changes were made to your prescriptions during this visit.            Kj Chacon, DO  12/15/24 0105       Kj Chacon, DO  12/15/24 0106

## 2024-12-15 NOTE — ED NOTES
"Patient arrived to ED with foul smell of old urine and generally unkempt appearance. Her brief was saturated with urine and feces that appeared to have been there for some time. She has had multiple falls at home, and she lives alone. Her feet are cyanotic with dirt and debris between her toes and toenails. Her son states, \"they've been that way for years.\" She does not appear to have the capability to meet her basic care needs.     APS reports completed. Web ID:[id number]  "

## 2025-02-15 ENCOUNTER — LAB REQUISITION (OUTPATIENT)
Dept: LAB | Facility: HOSPITAL | Age: 75
End: 2025-02-15
Payer: MEDICARE

## 2025-02-15 DIAGNOSIS — N39.0 URINARY TRACT INFECTION, SITE NOT SPECIFIED: ICD-10-CM

## 2025-02-15 LAB
BACTERIA UR QL AUTO: ABNORMAL /HPF
BILIRUB UR QL STRIP: NEGATIVE
CLARITY UR: ABNORMAL
COLOR UR: YELLOW
GLUCOSE UR STRIP-MCNC: NEGATIVE MG/DL
HGB UR QL STRIP.AUTO: ABNORMAL
HYALINE CASTS UR QL AUTO: ABNORMAL /LPF
KETONES UR QL STRIP: NEGATIVE
LEUKOCYTE ESTERASE UR QL STRIP.AUTO: NEGATIVE
NITRITE UR QL STRIP: NEGATIVE
PH UR STRIP.AUTO: 5.5 [PH] (ref 4.5–8)
PROT UR QL STRIP: ABNORMAL
RBC # UR STRIP: ABNORMAL /HPF
REF LAB TEST METHOD: ABNORMAL
SP GR UR STRIP: 1.03 (ref 1–1.03)
SQUAMOUS #/AREA URNS HPF: ABNORMAL /HPF
UROBILINOGEN UR QL STRIP: ABNORMAL
WBC # UR STRIP: ABNORMAL /HPF

## 2025-02-15 PROCEDURE — 87086 URINE CULTURE/COLONY COUNT: CPT | Performed by: FAMILY MEDICINE

## 2025-02-15 PROCEDURE — 81001 URINALYSIS AUTO W/SCOPE: CPT | Performed by: FAMILY MEDICINE

## 2025-02-17 LAB — BACTERIA SPEC AEROBE CULT: NO GROWTH

## 2025-05-13 ENCOUNTER — HOSPITAL ENCOUNTER (EMERGENCY)
Facility: HOSPITAL | Age: 75
Discharge: NURSING, REHAB OR LONG-TERM CARE | End: 2025-05-14
Payer: MEDICARE

## 2025-05-13 VITALS
DIASTOLIC BLOOD PRESSURE: 77 MMHG | BODY MASS INDEX: 20.28 KG/M2 | HEART RATE: 76 BPM | SYSTOLIC BLOOD PRESSURE: 113 MMHG | HEIGHT: 62 IN | TEMPERATURE: 97.8 F | WEIGHT: 110.23 LBS | OXYGEN SATURATION: 93 % | RESPIRATION RATE: 18 BRPM
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
## 2025-05-13 PROCEDURE — 99284 EMERGENCY DEPT VISIT MOD MDM: CPT

## 2025-05-14 ENCOUNTER — APPOINTMENT (OUTPATIENT)
Dept: CT IMAGING | Facility: HOSPITAL | Age: 75
End: 2025-05-14
Payer: MEDICARE

## 2025-05-14 PROCEDURE — 72125 CT NECK SPINE W/O DYE: CPT

## 2025-05-14 PROCEDURE — 70450 CT HEAD/BRAIN W/O DYE: CPT

## 2025-05-14 RX ORDER — ACETAMINOPHEN 500 MG
1000 TABLET ORAL ONCE
Status: COMPLETED | OUTPATIENT
Start: 2025-05-14 | End: 2025-05-14

## 2025-05-14 RX ADMIN — ACETAMINOPHEN 1000 MG: 500 TABLET, FILM COATED ORAL at 00:37

## 2025-05-14 NOTE — DISCHARGE INSTRUCTIONS
You were seen in the emergency department today for fall. Vital signs + a medical screening exam were performed, and the need for any labwork and/ or imaging were discussed. Results of the above, if performed, were also discussed as well as their clinical significance.     You should schedule a follow-up appointment with your family medicine doctor within the next 2-3 days. If you do not have a family medicine provider we can provide you with contact information to establish care.    You can use over-the-counter pain medicines for any pain where you hit your head or headaches, as well as ice packs as needed.  You should wear the previously prescribed cervical collar as you have a fracture in your second cervical vertebrae which is unhealed if you do not wear your prescribed collar this fracture can cause severe damage including paralysis.  Fever/chills, weakness in 1 arm or leg, confusion, severe uncontrolled headaches, vomiting.    Any prescriptions written today can have a paper copy provided for you to take to any pharmacy you would like in the event that your primary pharmacy is closed.  For any medications that were prescribed as a daily medication and for which you received a dose in the emergency department such as antibiotics, unless otherwise instructed take your 1st dose tomorrow. If you would prefer a paper copy of your prescription, let your treating physician or nurse know.     It is important to remember that symptoms and progression of illness/ injury can change, so do not hesitate to return to the Emergency Department for any new or worsening symptoms.     You should return to the Emergency Department or seek immediate medical care if you develop new or worsening symptoms including but not limited to fever/ chills, weakness in one arm or leg, numbness in one arm or leg, confusion, vomiting, uncontrolled headaches.

## 2025-05-14 NOTE — ED PROVIDER NOTES
Subjective   History of Present Illness    75-year-old female with history of chronic balance problems, cervical myelopathy, COPD, depression, hypercholesterolemia, hypertension, lymphocytic colitis, recurrent falls, vitamin D deficiency, presenting to the emergency department for evaluation status post fall.  Patient reports she was walking from her bed to her TV stand when she tripped and fell, falling onto the back of her head.  She denies any neck pain, back pain, no prodromal symptoms such as chest pain or shortness of breath, did not lose consciousness.  No blood thinner use.    Review of Systems    ROS as specified in HPI.      Past Medical History:   Diagnosis Date    Acute blood loss anemia     Ataxia     Balance problems     Cervical myelopathy     Chronic pain disorder     COPD (chronic obstructive pulmonary disease)     Depression     Encephalopathies     Fusion of spine of cervical region     High cholesterol     Hypertension     Lymphocytic colitis     Mixed incontinence     Multiple falls     Osteopenia     Overactive bladder     Pneumonia     Spinal stenosis, lumbar     Vitamin D deficiency        Allergies   Allergen Reactions    Sulfa Antibiotics Nausea And Vomiting       Past Surgical History:   Procedure Laterality Date    BACK SURGERY      CERVICAL CHIARI DECOMPRESSION POSTERIOR      CERVICAL SPINE SURGERY      fusion    TOTAL HIP ARTHROPLASTY Left     anterior approach       No family history on file.    Social History     Socioeconomic History    Marital status: Single   Tobacco Use    Smoking status: Every Day     Current packs/day: 0.50     Types: Cigarettes   Substance and Sexual Activity    Alcohol use: Yes     Comment: SOCIALLY    Drug use: Never    Sexual activity: Defer           Objective   Physical Exam  Vitals reviewed.   Constitutional:       General: She is not in acute distress.     Appearance: She is normal weight. She is not toxic-appearing.   HENT:      Head: Normocephalic.       Comments: Contusion to the right posterior scalp     Nose: Nose normal. No congestion.      Mouth/Throat:      Mouth: Mucous membranes are moist.      Pharynx: Oropharynx is clear.   Eyes:      General: No scleral icterus.     Extraocular Movements: Extraocular movements intact.      Conjunctiva/sclera: Conjunctivae normal.      Pupils: Pupils are equal, round, and reactive to light.   Neck:      Comments: No midline C spine tenderness, full range of motion  Cardiovascular:      Rate and Rhythm: Normal rate and regular rhythm.      Pulses: Normal pulses.      Heart sounds: No murmur heard.  Pulmonary:      Effort: Pulmonary effort is normal. No respiratory distress.      Breath sounds: No wheezing, rhonchi or rales.   Abdominal:      General: There is no distension.      Palpations: Abdomen is soft.      Tenderness: There is no abdominal tenderness.   Musculoskeletal:         General: No swelling. Normal range of motion.      Cervical back: Normal range of motion and neck supple. No rigidity.   Skin:     General: Skin is warm and dry.      Capillary Refill: Capillary refill takes less than 2 seconds.      Coloration: Skin is not jaundiced.   Neurological:      Mental Status: She is alert and oriented to person, place, and time. Mental status is at baseline.      Cranial Nerves: No cranial nerve deficit.      Sensory: No sensory deficit.   Psychiatric:         Mood and Affect: Mood normal.         Behavior: Behavior normal.         Procedures           ED Course                                                       Medical Decision Making  Problems Addressed:  Fall, initial encounter: complicated acute illness or injury    Amount and/or Complexity of Data Reviewed  Radiology: ordered.    Risk  OTC drugs.        Final diagnoses:   Fall, initial encounter       ED Disposition  ED Disposition       ED Disposition   Discharge    Condition   Stable    Comment   --               Wade Swann MD  [address] [facility]  [address] [address]  [phone]    Call in 3 days           Medication List      No changes were made to your prescriptions during this visit.         ED Course: 75-year-old female presenting for evaluation after mechanical fall.  Uses a walker at baseline, has a history of previous falls.  Trauma survey showed scalp contusion but no abrasions or lacerations, no focal neurologic deficits, no other injuries.  CT of the head and C-spine were obtained which showed no acute findings.  She did have a C2 fracture however this was already present and she is known to be noncompliant with recommended c-collar from previous hospitalization.  I did emphasize importance of using c-collar  to her for this injury, especially given that it appears that it is not healing.    Patient appropriate for discharge without further workup/ management from the Emergency Department. All questions were answered. Close return and follow-up instructions were provided, and pt was discharged home in stable condition.      EKG: None    Consults: None    Incidental Findings: None    Ideal body weight: 50.1 kg (110 lb 7.2 oz)       Paul Calvo DO  05/14/25 0358

## 2025-05-14 NOTE — ED TRIAGE NOTES
"Reports \"no balance\" since spinal surgery 5-6 years ago falls frequently uses walker    States she fell about 45min ago  did ot loose consciousness    Per [facility] ad EMS has bump on back of head    Pt denies any new pain ir discomfort    "

## 2025-05-20 ENCOUNTER — TRANSCRIBE ORDERS (OUTPATIENT)
Dept: ADMINISTRATIVE | Facility: HOSPITAL | Age: 75
End: 2025-05-20
Payer: MEDICARE

## 2025-05-20 DIAGNOSIS — R93.89 ABNORMAL CHEST X-RAY: Primary | ICD-10-CM

## 2025-06-06 ENCOUNTER — HOSPITAL ENCOUNTER (OUTPATIENT)
Dept: CT IMAGING | Facility: HOSPITAL | Age: 75
Discharge: HOME | End: 2025-06-06
Payer: MEDICARE

## 2025-06-06 DIAGNOSIS — R93.89 ABNORMAL CHEST X-RAY: ICD-10-CM

## 2025-06-06 PROCEDURE — 71250 CT THORAX DX C-: CPT
